# Patient Record
Sex: MALE | Race: WHITE | NOT HISPANIC OR LATINO | Employment: FULL TIME | URBAN - METROPOLITAN AREA
[De-identification: names, ages, dates, MRNs, and addresses within clinical notes are randomized per-mention and may not be internally consistent; named-entity substitution may affect disease eponyms.]

---

## 2019-04-15 ENCOUNTER — OFFICE VISIT (OUTPATIENT)
Dept: URGENT CARE | Facility: URGENT CARE | Age: 34
End: 2019-04-15
Payer: COMMERCIAL

## 2019-04-15 VITALS
DIASTOLIC BLOOD PRESSURE: 80 MMHG | BODY MASS INDEX: 45.94 KG/M2 | HEART RATE: 120 BPM | SYSTOLIC BLOOD PRESSURE: 160 MMHG | TEMPERATURE: 101.5 F | RESPIRATION RATE: 26 BRPM | WEIGHT: 315 LBS | OXYGEN SATURATION: 95 %

## 2019-04-15 DIAGNOSIS — J11.1 INFLUENZA: Primary | ICD-10-CM

## 2019-04-15 PROCEDURE — 99203 OFFICE O/P NEW LOW 30 MIN: CPT | Performed by: FAMILY MEDICINE

## 2019-04-15 RX ORDER — OSELTAMIVIR PHOSPHATE 75 MG/1
75 CAPSULE ORAL 2 TIMES DAILY
Qty: 10 CAPSULE | Refills: 0 | Status: SHIPPED | OUTPATIENT
Start: 2019-04-15 | End: 2019-04-20

## 2019-04-15 NOTE — LETTER
[facility]  [address]  [facility] [address]  Phone: [phone]    April 15, 2019        Afshin Sage  [address]   [place] [address]          To whom it may concern:    RE: Afshin Sage    Patient was seen and treated today at our clinic and treated for influenza. He should stay home tomorrow and rest and consider a return to work 4/17/19 if feeling well. Follow-up if symptoms persist or worsen.  I would expect this to gradually improve over the next 5 days.           Sincerely,        Keith Hebert MD

## 2019-04-15 NOTE — PROGRESS NOTES
SUBJECTIVE:   Afshin Sage is a 33 year old male presenting with a chief complaint of   Chief Complaint   Patient presents with     Breathing Problem     Trouble breathing started yesterday      Cough     Non productive until this am. Today coughing up brownish/red phlegm- sharp pain in center of chest when coughing, sneezing or breathing      Dizziness     Slight lightheadedness since yesterday afternoon      Fever     This morning       He is an established patient of [facility].    Fever, cough, shortness of breath, body aches and light headed.     Does not exercise much  Smoking on avg 1/2 pack per day. Was a pack a day until 2 years ago.       Review of Systems   Constitutional: Positive for fever. Negative for chills and diaphoresis.   HENT: Negative for congestion, ear pain, rhinorrhea and sore throat.    Respiratory: Positive for cough. Negative for shortness of breath.    Gastrointestinal: Negative for diarrhea, nausea and vomiting.   Neurological: Positive for dizziness.       History reviewed. No pertinent past medical history.  History reviewed. No pertinent family history.  Current Outpatient Medications   Medication Sig Dispense Refill     EFFEXOR 37.5 MG OR TABS 1 TABLET TWICE DAILY WITH FOOD Appt needed if no refills left (REPLACES ZOLOFT) (Patient not taking: No sig reported) 60 Tab 0     HYDROCHLOROTHIAZIDE 25 MG OR TABS 1 TABLET DAILY       HYDROXYZINE HCL 50 MG OR TABS 1 TABLET 4 TIMES DAILY AS NEEDED       NORVASC 5 MG OR TABS ONE DAILY Appt needed if no refills left (REPLACES hctz and propranolol) (Patient not taking: No sig reported) 31 Tab 0     PROPRANOLOL HCL 60 MG OR CPCR 1 CAPSULE DAILY       STRATTERA 25 MG OR CAPS 1 CAPSULE  DAILY Appt needed if no refills left (replaces 40 mg strattera) (Patient not taking: No sig reported) 31 Cap 0     STRATTERA 40 MG OR CAPS 1 CAPSULE EVERY MORNING       TRAZODONE  MG OR TABS 1/2-1 TABLET  AT BEDTIME AS NEEDED       ZOLOFT 100 MG OR TABS 2  TABLETS  DAILY       Social History     Tobacco Use     Smoking status: Current Every Day Smoker     Packs/day: 1.00     Years: 12.00     Pack years: 12.00     Types: Cigarettes     Smokeless tobacco: Current User   Substance Use Topics     Alcohol use: Not on file       OBJECTIVE  /80   Pulse 120   Temp 101.5  F (38.6  C) (Oral)   Resp 26   Wt (!) 157.9 kg (348 lb 3.2 oz)   SpO2 95%   BMI 45.94 kg/m      Physical Exam   Constitutional: He is oriented to person, place, and time.   Tired appearing   HENT:   Head: Normocephalic and atraumatic.   Right Ear: External ear normal.   Left Ear: External ear normal.   Nose: Nose normal.   Mouth/Throat: Oropharynx is clear and moist. No oropharyngeal exudate.   Eyes: Pupils are equal, round, and reactive to light. Conjunctivae are normal. Right eye exhibits no discharge. Left eye exhibits no discharge. No scleral icterus.   Neck: Neck supple. No tracheal deviation present. No thyromegaly present.   Cardiovascular: Normal rate, regular rhythm, normal heart sounds and intact distal pulses. Exam reveals no gallop and no friction rub.   No murmur heard.  Pulmonary/Chest: Effort normal and breath sounds normal. No stridor. No respiratory distress. He has no wheezes. He has no rales. He exhibits no tenderness.   Abdominal: Soft. Bowel sounds are normal. He exhibits no distension and no mass. There is no tenderness. There is no rebound and no guarding.   Musculoskeletal: He exhibits no edema, tenderness or deformity.   Lymphadenopathy:     He has no cervical adenopathy.   Neurological: He is alert and oriented to person, place, and time. No cranial nerve deficit.   Skin: Skin is warm and dry. No rash noted. No erythema.   Psychiatric: Judgment normal.            ASSESSMENT:    ICD-10-CM    1. Influenza J11.1 oseltamivir (TAMIFLU) 75 MG capsule        PLAN  Annual flu vaccine recommended.   Fluids, rest and hydration emphasized. ADAT  Complete smoking cessation encouraged    The patient indicates understanding of these issues and agrees with the plan.   Patient educational/instructional material provided including reasons for follow-up   Keith Hebert MD

## 2019-04-17 ASSESSMENT — ENCOUNTER SYMPTOMS
CHILLS: 0
DIZZINESS: 1
COUGH: 1
RHINORRHEA: 0
SORE THROAT: 0
DIAPHORESIS: 0
SHORTNESS OF BREATH: 0
DIARRHEA: 0
NAUSEA: 0
FEVER: 1
VOMITING: 0

## 2019-05-02 ENCOUNTER — OFFICE VISIT (OUTPATIENT)
Dept: FAMILY MEDICINE | Facility: CLINIC | Age: 34
End: 2019-05-02
Payer: COMMERCIAL

## 2019-05-02 VITALS
DIASTOLIC BLOOD PRESSURE: 64 MMHG | RESPIRATION RATE: 20 BRPM | SYSTOLIC BLOOD PRESSURE: 134 MMHG | WEIGHT: 315 LBS | TEMPERATURE: 97.9 F | HEART RATE: 89 BPM | OXYGEN SATURATION: 97 % | BODY MASS INDEX: 41.75 KG/M2 | HEIGHT: 73 IN

## 2019-05-02 DIAGNOSIS — S91.332A PUNCTURE WOUND OF LEFT FOOT, INITIAL ENCOUNTER: ICD-10-CM

## 2019-05-02 DIAGNOSIS — J20.9 ACUTE BRONCHITIS WITH SYMPTOMS > 10 DAYS: Primary | ICD-10-CM

## 2019-05-02 PROCEDURE — 99213 OFFICE O/P EST LOW 20 MIN: CPT | Performed by: PHYSICIAN ASSISTANT

## 2019-05-02 RX ORDER — CODEINE PHOSPHATE AND GUAIFENESIN 10; 100 MG/5ML; MG/5ML
1-2 SOLUTION ORAL EVERY 4 HOURS PRN
Qty: 118 ML | Refills: 0 | Status: SHIPPED | OUTPATIENT
Start: 2019-05-02 | End: 2022-03-04

## 2019-05-02 RX ORDER — AZITHROMYCIN 500 MG/1
500 TABLET, FILM COATED ORAL DAILY
Qty: 5 TABLET | Refills: 0 | Status: SHIPPED | OUTPATIENT
Start: 2019-05-02 | End: 2019-05-05

## 2019-05-02 ASSESSMENT — ANXIETY QUESTIONNAIRES
1. FEELING NERVOUS, ANXIOUS, OR ON EDGE: SEVERAL DAYS
7. FEELING AFRAID AS IF SOMETHING AWFUL MIGHT HAPPEN: NOT AT ALL
IF YOU CHECKED OFF ANY PROBLEMS ON THIS QUESTIONNAIRE, HOW DIFFICULT HAVE THESE PROBLEMS MADE IT FOR YOU TO DO YOUR WORK, TAKE CARE OF THINGS AT HOME, OR GET ALONG WITH OTHER PEOPLE: NOT DIFFICULT AT ALL
5. BEING SO RESTLESS THAT IT IS HARD TO SIT STILL: NOT AT ALL
3. WORRYING TOO MUCH ABOUT DIFFERENT THINGS: NOT AT ALL
GAD7 TOTAL SCORE: 1
6. BECOMING EASILY ANNOYED OR IRRITABLE: NOT AT ALL
2. NOT BEING ABLE TO STOP OR CONTROL WORRYING: NOT AT ALL

## 2019-05-02 ASSESSMENT — MIFFLIN-ST. JEOR: SCORE: 2542.33

## 2019-05-02 ASSESSMENT — PATIENT HEALTH QUESTIONNAIRE - PHQ9
SUM OF ALL RESPONSES TO PHQ QUESTIONS 1-9: 1
5. POOR APPETITE OR OVEREATING: NOT AT ALL

## 2019-05-02 NOTE — PROGRESS NOTES
"  SUBJECTIVE:   Afshin Sage is a 34 year old male who presents to clinic today for the following   health issues:      Patient presents with:  RECHECK: gary. was seen at [facility] about 2 weeks ago. 5 day ago flu like symptoms started to show up again. would like to r/o pneumonia  Foot Injury: left foot    Additional history: Patient stepped on a belt buckle.  Site painful but no erythema or suppuration.     Reviewed  and updated as needed this visit by clinical staff  Tobacco  Allergies  Meds  Med Hx  Surg Hx  Fam Hx  Soc Hx          ROS:  Constitutional, HEENT, cardiovascular, pulmonary, gi and gu systems are negative, except as otherwise noted.    OBJECTIVE:     /64   Pulse 89   Temp 97.9  F (36.6  C) (Oral)   Resp 20   Ht 1.862 m (6' 1.31\")   Wt (!) 154.9 kg (341 lb 6.4 oz)   SpO2 97%   BMI 44.67 kg/m    Body mass index is 44.67 kg/m .  GENERAL: healthy, alert and no distress  HENT: normal cephalic/atraumatic, ear canals and TM's normal, nasal mucosa edematous , oropharynx clear and oral mucous membranes moist  NECK: no adenopathy, no asymmetry, masses, or scars and thyroid normal to palpation  RESP: lungs clear to auscultation - no rales, rhonchi or wheezes but harsh  MS: no gross musculoskeletal defects noted, no edema  SKIN: 3 mm puncture wound plantar surface L foot.  No erythema or suppuration noted.     Diagnostic Test Results:  none     ASSESSMENT/PLAN:   1. Acute bronchitis with symptoms > 10 days  - azithromycin (ZITHROMAX) 500 MG tablet; Take 1 tablet (500 mg) by mouth daily for 3 days  Dispense: 5 tablet; Refill: 0  - guaiFENesin-codeine (ROBITUSSIN AC) 100-10 MG/5ML solution; Take 5-10 mLs by mouth every 4 hours as needed for cough  Dispense: 118 mL; Refill: 0    2. Puncture wound of left foot, initial encounter  Scrub with soap and water, dab with hydrogen peroxide and apply Vaseline daily.  May use hydrogen peroxide as a brief soak to remove any crusted " material before scrubbing if needed.     Use medication as directed.  Follow up if symptoms should persist, change or worsen.  Patient amenable to this follow up plan.     James Daugherty PA-C  [facility]

## 2019-05-03 ASSESSMENT — ANXIETY QUESTIONNAIRES: GAD7 TOTAL SCORE: 1

## 2019-11-07 ENCOUNTER — OFFICE VISIT (OUTPATIENT)
Dept: FAMILY MEDICINE | Facility: CLINIC | Age: 34
End: 2019-11-07
Payer: COMMERCIAL

## 2019-11-07 VITALS
DIASTOLIC BLOOD PRESSURE: 82 MMHG | BODY MASS INDEX: 44.46 KG/M2 | WEIGHT: 315 LBS | SYSTOLIC BLOOD PRESSURE: 136 MMHG | RESPIRATION RATE: 18 BRPM | HEART RATE: 89 BPM | OXYGEN SATURATION: 96 % | TEMPERATURE: 97.3 F

## 2019-11-07 DIAGNOSIS — R68.83 CHILLS: Primary | ICD-10-CM

## 2019-11-07 DIAGNOSIS — J06.9 VIRAL URI WITH COUGH: ICD-10-CM

## 2019-11-07 LAB
FLUAV+FLUBV AG SPEC QL: NEGATIVE
FLUAV+FLUBV AG SPEC QL: NEGATIVE
SPECIMEN SOURCE: NORMAL

## 2019-11-07 PROCEDURE — 99213 OFFICE O/P EST LOW 20 MIN: CPT | Performed by: PHYSICIAN ASSISTANT

## 2019-11-07 PROCEDURE — 87804 INFLUENZA ASSAY W/OPTIC: CPT | Performed by: PHYSICIAN ASSISTANT

## 2019-11-07 RX ORDER — CODEINE PHOSPHATE AND GUAIFENESIN 10; 100 MG/5ML; MG/5ML
1-2 SOLUTION ORAL EVERY 4 HOURS PRN
Qty: 118 ML | Refills: 0 | Status: SHIPPED | OUTPATIENT
Start: 2019-11-07 | End: 2022-03-04

## 2019-11-07 NOTE — PROGRESS NOTES
SUBJECTIVE:  Afshin Sage is a 34 year old male who presents with the following concerns;              Symptoms: cc Present Absent Comment   Fever/Chills  x  Chills, felt warm   Fatigue  x     Muscle Aches  x     Eye Irritation   x    Sneezing  x     Nasal Sarath/Drg  x     Sinus Pressure/Pain  x     Loss of smell  x     Dental pain   x    Sore Throat   x    Swollen Glands    Not sure   Ear Pain/Fullness   x    Cough  x     Wheeze  x     Chest Pain  x     Shortness of breath  x     Rash   x    Other   x      Symptom duration:  x3days   Sympom severity:  worsening   Treatments tried:  OTC tylenol, Advil, nyQuil    Contacts:  work-2 co-worker tested positive for influenza a       Medications updated and reviewed.  Past, family and surgical history is updated and reviewed in the record.    ROS:  Other than noted above, general, HEENT, respiratory, cardiac and gastrointestinal systems are negative.    OBJECTIVE:  BP (!) 136/98   Pulse 89   Temp 97.3  F (36.3  C) (Tympanic)   Resp 18   Wt (!) 154.1 kg (339 lb 12.8 oz)   SpO2 96%   BMI 44.46 kg/m    GENERAL: Pleasant and interactive.  Alert and oriented x 3.  No acute distress.   HEENT: Diffuse pharyngeal erythema.  Sclera, lids and conjunctiva are normal.  Nose and ears clear.  NECK: No adenopathy.  CHEST:  clear, no wheezing or rales. Normal symmetric air entry throughout both lung fields. No chest wall deformities or tenderness.  HEART:  S1 and S2 normal, no murmurs, clicks, gallops or rubs. Regular rate and rhythm.  SKIN:  Only benign skin findings. No unusual rashes or suspicious skin lesions noted. Nails appear normal.    Influenza: neg      Assessment:    Encounter Diagnoses   Name Primary?     Chills Yes     Viral URI with cough      Plan:   Orders Placed This Encounter     guaiFENesin-codeine (ROBITUSSIN AC) 100-10 MG/5ML solution         Supportive therapy also discussed. Follow up if symptoms fail to improve or worsen.      The patient was in  agreement with the plan today and had no questions or concerns prior to leaving the clinic.     Ashleigh Mcintyre PA-C

## 2019-11-07 NOTE — LETTER
[facility] [place]  [address]  [place] [address]  Phone: [phone]    November 7, 2019        Afshin Sage  [address]   [place] [address]          To whom it may concern:    RE: Afshin Sage    Patient was seen and treated today at our clinic and will miss work tonight and tomorrow (11/8/19).    Please contact me for questions or concerns.      Sincerely,        Ashleigh Mcintyre PA-C

## 2019-11-07 NOTE — PATIENT INSTRUCTIONS
For sinus congestion: Sudafed and DayQuil  For cough: Delsym and NyQuil (before bed)  For pain: ibuprofen 600mg every 6-8 hours as needed    Your strep test is negative. Your symptoms are likely caused by a viral syndrome. Viral syndromes typically last 1-2 weeks.  Increase your water intake in order to keep the secretions/mucous in your upper respiratory tract thin. Get plenty of rest and wash your hands well. Follow up if symptoms fail to improve or worsen.      Call the clinic if your symptoms have not shown improvement by the 2 week mane.

## 2019-12-23 ENCOUNTER — ANCILLARY PROCEDURE (OUTPATIENT)
Dept: GENERAL RADIOLOGY | Facility: CLINIC | Age: 34
End: 2019-12-23
Attending: FAMILY MEDICINE
Payer: COMMERCIAL

## 2019-12-23 ENCOUNTER — OFFICE VISIT (OUTPATIENT)
Dept: FAMILY MEDICINE | Facility: CLINIC | Age: 34
End: 2019-12-23
Payer: COMMERCIAL

## 2019-12-23 VITALS
DIASTOLIC BLOOD PRESSURE: 94 MMHG | SYSTOLIC BLOOD PRESSURE: 160 MMHG | BODY MASS INDEX: 44.74 KG/M2 | TEMPERATURE: 98.9 F | OXYGEN SATURATION: 97 % | HEART RATE: 90 BPM | WEIGHT: 315 LBS

## 2019-12-23 DIAGNOSIS — M54.50 ACUTE MIDLINE LOW BACK PAIN WITHOUT SCIATICA: ICD-10-CM

## 2019-12-23 DIAGNOSIS — M54.50 ACUTE MIDLINE LOW BACK PAIN WITHOUT SCIATICA: Primary | ICD-10-CM

## 2019-12-23 DIAGNOSIS — E66.01 MORBID OBESITY (H): ICD-10-CM

## 2019-12-23 LAB
ALBUMIN UR-MCNC: ABNORMAL MG/DL
AMORPH CRY #/AREA URNS HPF: ABNORMAL /HPF
APPEARANCE UR: ABNORMAL
BILIRUB UR QL STRIP: NEGATIVE
COLOR UR AUTO: YELLOW
GLUCOSE UR STRIP-MCNC: NEGATIVE MG/DL
HGB UR QL STRIP: NEGATIVE
KETONES UR STRIP-MCNC: NEGATIVE MG/DL
LEUKOCYTE ESTERASE UR QL STRIP: NEGATIVE
NITRATE UR QL: NEGATIVE
PH UR STRIP: 8 PH (ref 5–7)
RBC #/AREA URNS AUTO: ABNORMAL /HPF
SOURCE: ABNORMAL
SP GR UR STRIP: 1.01 (ref 1–1.03)
UROBILINOGEN UR STRIP-ACNC: 0.2 EU/DL (ref 0.2–1)
WBC #/AREA URNS AUTO: ABNORMAL /HPF

## 2019-12-23 PROCEDURE — 81001 URINALYSIS AUTO W/SCOPE: CPT | Performed by: FAMILY MEDICINE

## 2019-12-23 PROCEDURE — 99214 OFFICE O/P EST MOD 30 MIN: CPT | Performed by: FAMILY MEDICINE

## 2019-12-23 PROCEDURE — 72100 X-RAY EXAM L-S SPINE 2/3 VWS: CPT

## 2019-12-23 RX ORDER — MELOXICAM 15 MG/1
15 TABLET ORAL DAILY
Qty: 15 TABLET | Refills: 0 | Status: SHIPPED | OUTPATIENT
Start: 2019-12-23 | End: 2022-03-04

## 2019-12-23 RX ORDER — CYCLOBENZAPRINE HCL 10 MG
10 TABLET ORAL 3 TIMES DAILY PRN
Qty: 15 TABLET | Refills: 0 | Status: SHIPPED | OUTPATIENT
Start: 2019-12-23 | End: 2022-03-04

## 2019-12-23 NOTE — PROGRESS NOTES
Subjective     Afshin Sage is a 34 year old male who presents to clinic today for the following health issues:    HPI   Back Pain       Duration: x1 week ago        Specific cause: unsure    Description:   Location of pain: middle of back both  Character of pain: stabbing and intermittent  Pain radiation:none  New numbness or weakness in legs, not attributed to pain:  no     Intensity: Currently 4/10    History:   Pain interferes with job: YES  History of back problems: no prior back problems  Any previous MRI or X-rays: None  Sees a specialist for back pain:  No  Therapies tried without relief: IBU, icy hot patches and acetaminophen (Tylenol)    Alleviating factors:   Improved by: none      Precipitating factors:  Worsened by: any movements          Accompanying Signs & Symptoms:  Risk of Fracture:  None  Risk of Cauda Equina:  None  Risk of Infection:  None  Risk of Cancer:  None  Risk of Ankylosing Spondylitis:  Onset at age <35, male, AND morning back stiffness. no                     Patient Active Problem List   Diagnosis     Hypertension     ADHD (attention deficit hyperactivity disorder)     Alcohol dependence in remission (H)     Mixed anxiety and depressive disorder     Morbid obesity (H)     History reviewed. No pertinent surgical history.    Social History     Tobacco Use     Smoking status: Current Every Day Smoker     Packs/day: 1.00     Years: 12.00     Pack years: 12.00     Types: Cigarettes     Smokeless tobacco: Never Used   Substance Use Topics     Alcohol use: Not Currently     History reviewed. No pertinent family history.      Current Outpatient Medications   Medication Sig Dispense Refill     cyclobenzaprine (FLEXERIL) 10 MG tablet Take 1 tablet (10 mg) by mouth 3 times daily as needed for muscle spasms 15 tablet 0     meloxicam (MOBIC) 15 MG tablet Take 1 tablet (15 mg) by mouth daily 15 tablet 0     EFFEXOR 37.5 MG OR TABS 1 TABLET TWICE DAILY WITH FOOD Appt needed if no refills left  (REPLACES ZOLOFT) (Patient not taking: No sig reported) 60 Tab 0     guaiFENesin-codeine (ROBITUSSIN AC) 100-10 MG/5ML solution Take 5-10 mLs by mouth every 4 hours as needed for cough (Patient not taking: Reported on 12/23/2019) 118 mL 0     guaiFENesin-codeine (ROBITUSSIN AC) 100-10 MG/5ML solution Take 5-10 mLs by mouth every 4 hours as needed for cough (Patient not taking: Reported on 12/23/2019) 118 mL 0     HYDROCHLOROTHIAZIDE 25 MG OR TABS 1 TABLET DAILY       HYDROXYZINE HCL 50 MG OR TABS 1 TABLET 4 TIMES DAILY AS NEEDED       NORVASC 5 MG OR TABS ONE DAILY Appt needed if no refills left (REPLACES hctz and propranolol) (Patient not taking: No sig reported) 31 Tab 0     PROPRANOLOL HCL 60 MG OR CPCR 1 CAPSULE DAILY       STRATTERA 25 MG OR CAPS 1 CAPSULE  DAILY Appt needed if no refills left (replaces 40 mg strattera) (Patient not taking: No sig reported) 31 Cap 0     STRATTERA 40 MG OR CAPS 1 CAPSULE EVERY MORNING       TRAZODONE  MG OR TABS 1/2-1 TABLET  AT BEDTIME AS NEEDED       ZOLOFT 100 MG OR TABS 2 TABLETS  DAILY       No Known Allergies  No lab results found.   BP Readings from Last 3 Encounters:   12/23/19 (!) 160/94   11/07/19 136/82   05/02/19 134/64    Wt Readings from Last 3 Encounters:   12/23/19 (!) 155.1 kg (342 lb)   11/07/19 (!) 154.1 kg (339 lb 12.8 oz)   05/02/19 (!) 154.9 kg (341 lb 6.4 oz)                  Wt Readings from Last 4 Encounters:   12/23/19 (!) 155.1 kg (342 lb)   11/07/19 (!) 154.1 kg (339 lb 12.8 oz)   05/02/19 (!) 154.9 kg (341 lb 6.4 oz)   04/15/19 (!) 157.9 kg (348 lb 3.2 oz)         Reviewed and updated as needed this visit by Provider  Tobacco  Allergies  Meds  Problems  Med Hx  Surg Hx  Fam Hx         Review of Systems   ROS COMP: Constitutional, HEENT, cardiovascular, pulmonary, gi and gu systems are negative, except as otherwise noted.      Objective    BP (!) 160/94   Pulse 90   Temp 98.9  F (37.2  C) (Oral)   Wt (!) 155.1 kg (342 lb)   SpO2 97%   " BMI 44.74 kg/m    Body mass index is 44.74 kg/m .  Physical Exam   GENERAL: healthy, alert and no distress  EYES: Eyes grossly normal to inspection, PERRL and conjunctivae and sclerae normal  HENT: ear canals and TM's normal, nose and mouth without ulcers or lesions  NECK: no adenopathy, no asymmetry, masses, or scars and thyroid normal to palpation  RESP: lungs clear to auscultation - no rales, rhonchi or wheezes  CV: regular rate and rhythm, normal S1 S2, no S3 or S4, no murmur, click or rub, no peripheral edema and peripheral pulses strong  ABDOMEN: soft, nontender, no hepatosplenomegaly, no masses and bowel sounds normal  MS: no gross musculoskeletal defects noted, no edema  SKIN: no suspicious lesions or rashes  NEURO: Normal strength and tone, mentation intact and speech normal  PSYCH: mentation appears normal, affect normal/bright            Assessment & Plan     1. Acute midline low back pain without sciatica  Patient present with lower back pain for 1 week  No red flags  Will obtain UA to r/o kidney stones   X ray done showed no acute process   Prescribed Mobic and Flexeril.   - UA with Microscopic  - XR Lumbar Spine 2/3 Views; Future  - meloxicam (MOBIC) 15 MG tablet; Take 1 tablet (15 mg) by mouth daily  Dispense: 15 tablet; Refill: 0  - cyclobenzaprine (FLEXERIL) 10 MG tablet; Take 1 tablet (10 mg) by mouth 3 times daily as needed for muscle spasms  Dispense: 15 tablet; Refill: 0    2. Morbid obesity (H)  Discussed diet and exercise     BMI:   Estimated body mass index is 44.74 kg/m  as calculated from the following:    Height as of 5/2/19: 1.862 m (6' 1.31\").    Weight as of this encounter: 155.1 kg (342 lb).   Weight management plan: Discussed healthy diet and exercise guidelines    Patient verbalized understanding and agreed on the plan of care.  All questions answered.    Work on weight loss  Regular exercise    Return in about 4 weeks (around 1/20/2020).    Adrian Eddy MD  [facility] " [place]

## 2020-03-09 ENCOUNTER — TELEPHONE (OUTPATIENT)
Dept: FAMILY MEDICINE | Facility: CLINIC | Age: 35
End: 2020-03-09

## 2020-03-09 NOTE — LETTER
Afshin Sage  [address]   [place] [address]          March 9, 2020          Giacomo Sage      Our records indicate that you have not scheduled for a(n)Blood pressure check  which was recommended by your health care team. Monitoring and managing your preventative and chronic health conditions are very important to us.       If you have received your health care elsewhere, please provide us with that information so it can be documented in your chart.    Please call [phone] or message us through your [employer] account to schedule an appointment or provide information for your chart.     We look forward to seeing you and working with you on your health care needs.     Sincerely,   Dr. Eddy          *If you have already scheduled an appointment, please disregard this reminder

## 2020-03-09 NOTE — TELEPHONE ENCOUNTER
Panel Management Review      Patient has the following on his problem list:     Hypertension   Last three blood pressure readings:  BP Readings from Last 3 Encounters:   12/23/19 (!) 160/94   11/07/19 136/82   05/02/19 134/64     Blood pressure: FAILED    HTN Guidelines:  Less than 140/90      Composite cancer screening  Chart review shows that this patient is due/due soon for the following None  Summary:    Patient is due/failing the following:   BP CHECK    Action needed:   Patient needs office visit for see above.    Type of outreach:    Sent letter.    Questions for provider review:    None                                                                                                                                   Sera Bunch [facility]       Chart routed to closed .

## 2020-08-03 ENCOUNTER — TELEPHONE (OUTPATIENT)
Dept: FAMILY MEDICINE | Facility: CLINIC | Age: 35
End: 2020-08-03

## 2020-08-03 NOTE — LETTER
August 3, 2020      Afshin Sage  [address]   [facility] [address]        Hi, Afshin,     Our records indicate that you have not scheduled for a(n) blood pressure check with your primary care provider which is due now and recommended by your health care team. Managing your preventative and chronic health conditions is important to us. During the COVID-19 pandemic, options are available for you to safely get the care you need. If you have received your health care elsewhere, please provide us with that information so it can be documented in your chart.    Video Visit:  Please schedule a video visit. Request an appointment through [employer] or call us at [phone].    A video visit is a two-way, real-time, interactive video and audio appointment with your provider, using a secure dioni called [employer] Touchpoint on your mobile device or a browser on your computer. Video visits will be billed the same as in-person visits, including deductibles and co-insurance.     Telephone Visit:  You can schedule a telephone visit. Request an appointment through [employer] or call us at [phone].    A telephone visit is a two-way, real-time, audio scheduled appointment with your provider. Telephone visits are billed at different rates depending on your insurance coverage. During this emergency period, for some insurers, patients may be billed the same as an in-person visit. Please reach out to your insurance provider with any questions.       Thank you for choosing us as a partner in health care.     Your [facility] Care Team

## 2020-08-03 NOTE — TELEPHONE ENCOUNTER
Patient Quality Outreach      Summary:    Patient is due/failing the following:   Hypertension follow-up visit    Type of outreach:    Sent letter.    Questions for provider review:    None                                                                                   **Start Working phrase here:**       Patient has the following on his problem list/HM:     Hypertension   Last three blood pressure readings:  BP Readings from Last 3 Encounters:   12/23/19 (!) 160/94   11/07/19 136/82   05/02/19 134/64     Blood pressure: Failed    HTN Guidelines:  ? 139/89                                                 Sera Bunch [facility]     Chart routed to closed.

## 2020-08-10 ENCOUNTER — NURSE TRIAGE (OUTPATIENT)
Dept: NURSING | Facility: CLINIC | Age: 35
End: 2020-08-10

## 2020-08-10 ENCOUNTER — OFFICE VISIT (OUTPATIENT)
Dept: URGENT CARE | Facility: URGENT CARE | Age: 35
End: 2020-08-10

## 2020-08-10 VITALS
BODY MASS INDEX: 48.72 KG/M2 | DIASTOLIC BLOOD PRESSURE: 102 MMHG | HEART RATE: 97 BPM | OXYGEN SATURATION: 96 % | TEMPERATURE: 99.7 F | RESPIRATION RATE: 24 BRPM | SYSTOLIC BLOOD PRESSURE: 170 MMHG | WEIGHT: 315 LBS

## 2020-08-10 DIAGNOSIS — S06.0X0A CONCUSSION WITHOUT LOSS OF CONSCIOUSNESS, INITIAL ENCOUNTER: Primary | ICD-10-CM

## 2020-08-10 PROCEDURE — 99213 OFFICE O/P EST LOW 20 MIN: CPT | Performed by: FAMILY MEDICINE

## 2020-08-10 ASSESSMENT — PAIN SCALES - GENERAL: PAINLEVEL: SEVERE PAIN (6)

## 2020-08-10 NOTE — PROGRESS NOTES
SUBJECTIVE:   Afshin Sage is a 35 year old male presenting with a chief complaint of   Chief Complaint   Patient presents with     Head Injury     Hit left side of head on brick wall     Work Comp     08/10/2020       He is an established patient of [facility].    Afshin is a 35-year-old male presenting today after a concussion type injury while at work.  While in the close residential closet and attempting to stand he hit his head against a low brick wall that he had forgotten was there immediately he noticed a white flash of light in it since that time is had headache along the left parietal area 6/10 and improving some of his scalp is also feeling somewhat dizzy over the past half hour.  This instance happened approximately 3 hours ago.  He had called the nurse line wondering what he should come to the urgent care was feeling dizzy on the phone and therefore was advised to present to the urgent care.    Denies any previous concussions recent head injuries or falls denies any easy bruising bleeding or clotting disorders      Past history is significant for previous knee surgery ACL reconstruction of his right knee.  Was on HCTZ for blood pressure about 10 years ago was lightheaded at that time and so it was stopped he is not currently taking any blood pressure medicines.  His blood pressure does seem to be somewhat elevated.        Rare EtOH      Tylenol or ibuprofen for headaches averaging approximately 2 headaches per month seem to resolve very quickly on either Tylenol or ibuprofen.      Review of Systems    History reviewed. No pertinent past medical history.  History reviewed. No pertinent family history.  Current Outpatient Medications   Medication Sig Dispense Refill     cyclobenzaprine (FLEXERIL) 10 MG tablet Take 1 tablet (10 mg) by mouth 3 times daily as needed for muscle spasms (Patient not taking: Reported on 8/10/2020) 15 tablet 0     EFFEXOR 37.5 MG OR TABS 1 TABLET TWICE DAILY WITH FOOD Appt  needed if no refills left (REPLACES ZOLOFT) (Patient not taking: No sig reported) 60 Tab 0     guaiFENesin-codeine (ROBITUSSIN AC) 100-10 MG/5ML solution Take 5-10 mLs by mouth every 4 hours as needed for cough (Patient not taking: Reported on 12/23/2019) 118 mL 0     guaiFENesin-codeine (ROBITUSSIN AC) 100-10 MG/5ML solution Take 5-10 mLs by mouth every 4 hours as needed for cough (Patient not taking: Reported on 12/23/2019) 118 mL 0     HYDROCHLOROTHIAZIDE 25 MG OR TABS 1 TABLET DAILY       HYDROXYZINE HCL 50 MG OR TABS 1 TABLET 4 TIMES DAILY AS NEEDED       meloxicam (MOBIC) 15 MG tablet Take 1 tablet (15 mg) by mouth daily (Patient not taking: Reported on 8/10/2020) 15 tablet 0     NORVASC 5 MG OR TABS ONE DAILY Appt needed if no refills left (REPLACES hctz and propranolol) (Patient not taking: No sig reported) 31 Tab 0     PROPRANOLOL HCL 60 MG OR CPCR 1 CAPSULE DAILY       STRATTERA 25 MG OR CAPS 1 CAPSULE  DAILY Appt needed if no refills left (replaces 40 mg strattera) (Patient not taking: No sig reported) 31 Cap 0     STRATTERA 40 MG OR CAPS 1 CAPSULE EVERY MORNING       TRAZODONE  MG OR TABS 1/2-1 TABLET  AT BEDTIME AS NEEDED       ZOLOFT 100 MG OR TABS 2 TABLETS  DAILY       Social History     Tobacco Use     Smoking status: Current Every Day Smoker     Packs/day: 1.00     Years: 12.00     Pack years: 12.00     Types: Cigarettes     Smokeless tobacco: Never Used   Substance Use Topics     Alcohol use: Not Currently       OBJECTIVE  BP (!) 170/102   Pulse 97   Temp 99.7  F (37.6  C) (Tympanic)   Resp 24   Wt (!) 168.9 kg (372 lb 6.4 oz)   SpO2 96%   BMI 48.72 kg/m      Physical Exam  Vitals signs reviewed.   HENT:      Head: Normocephalic.   Neck:      Musculoskeletal: Normal range of motion.   Cardiovascular:      Rate and Rhythm: Normal rate.   Pulmonary:      Effort: Pulmonary effort is normal.   Musculoskeletal:         General: Tenderness (Left parietal area of the scalp only without any  abrasions or evidence of swelling) present.   Skin:     General: Skin is warm.      Capillary Refill: Capillary refill takes less than 2 seconds.   Neurological:      General: No focal deficit present.      Mental Status: He is alert.   Psychiatric:         Mood and Affect: Mood normal.           ASSESSMENT:    ICD-10-CM    1. Concussion without loss of consciousness, initial encounter  S06.0X0A           PLAN:  He will avoid all exertional activity of falls or second impact over the next 7 to 10 days  Work note was provided with the following restrictions he may return to work if feeling well tomorrow (see letter)  Expect this to resolve over 7 to 10 days he may use Tylenol or ibuprofen proper dosing was described.  The patient indicates understanding of these issues and agrees with the plan.   Patient educational/instructional material provided including reasons for follow-up   Keith Hebert MD

## 2020-08-10 NOTE — TELEPHONE ENCOUNTER
"Pt called in stated he had head injury.  The injury happened 1:30 PM today.  The Pt states concrete wall ht him hard.  Pt states has dent on his head at the left side.  No bleeding.  Pt has head pain 6/10 on the scale.  No neck pain,  No vomit.  Has nausea.  Had disoriented symptom.  The disposition is to be seen today.  Care advice given per protocol.  The mother states she will take the Pt to the [facility] tomorrow.  Patient agrees with care advice given.   Agreed to call back if he has additional symptoms or questions.    Isaiah Tatum [facility] Nurse Advisor 8/10/2020 3:46 PM          Additional Information    Negative: [1] ACUTE NEURO SYMPTOM AND [2] present now  (DEFINITION: difficult to awaken OR confused thinking and talking OR slurred speech OR weakness of arms OR unsteady walking)    Negative: Knocked out (unconscious) > 1 minute    Negative: Seizure (convulsion) occurred  (Exception: prior history of seizures and now alert and without Acute Neuro Symptoms)    Negative: Penetrating head injury (e.g., knife, gun shot wound, metal object)    Negative: [1] Major bleeding (e.g., actively dripping or spurting) AND [2] can't be stopped    Negative: [1] Dangerous mechanism of injury (e.g., MVA, diving, trampoline, contact sports, fall > 10 feet or 3 meters) AND [2] NECK pain AND [3] began < 1 hour after injury    Negative: Sounds like a life-threatening emergency to the triager    Negative: [1] Recently examined and diagnosed with a concussion by a healthcare provider AND [2] questions about concussion symptoms    Negative: Can't remember what happened (amnesia)    Negative: Vomiting once or more    Negative: [1] Loss of vision or double vision AND [2] present now    Negative: Watery or blood-tinged fluid dripping from the NOSE or EARS now  (Exception: tears from crying or nosebleed from nasal trauma)    Negative: One or two \"black eyes\" (bruising, purple color of eyelids)    Negative: Large swelling or bruise > 2 inches " (5 cm)    Negative: Skin is split open or gaping  (or length > 1/2 inch or 12 mm)    Negative: [1] Bleeding AND [2] won't stop after 10 minutes of direct pressure (using correct technique)    Negative: Sounds like a serious injury to the triager    Negative: [1] ACUTE NEURO SYMPTOM AND [2] now fine  (DEFINITION: difficult to awaken OR confused thinking and talking OR slurred speech OR weakness of arms OR unsteady walking)    Negative: [1] Knocked out (unconscious) < 1 minute AND [2] now fine    Negative: [1] SEVERE headache AND [2] not improved 2 hours after pain medicine/ice packs    Negative: Dangerous injury (e.g., MVA, diving, trampoline, contact sports, fall > 10 feet or 3 meters) or severe blow from hard object (e.g., golf club or baseball bat)    Negative: Taking Coumadin (warfarin) or other strong blood thinner, or known bleeding disorder (e.g., thrombocytopenia)    Negative: Suspicious history for the injury    Negative: [1] Age over 65 years AND [2] swelling or bruise    Negative: Patient is confused or is an unreliable provider of information (e.g., dementia, profound mental retardation, alcohol intoxication)    Negative: [1] Last tetanus shot > 5 years ago AND [2] DIRTY cut or scrape    Negative: [1] After 72 hours AND [2] headache persists    Scalp swelling, bruise or pain    Protocols used: HEAD INJURY-A-

## 2020-08-10 NOTE — LETTER
[address] [address]  Phone: [phone]    August 10, 2020        Afshin Sage  [address]   [facility] [address]          To whom it may concern:    RE: Afshin Sage    Patient was seen and treated today at our clinic.  Sustained a mild concussion at work today when his head struck a brick wall by accident.  He may consider return to work tomorrow if his headache is improving.  If his headache is worsening he should consider repeat evaluation before returning to work.    Regardless, over the next 7 to 10 days he should avoid any extreme exertional activities or second impact injuries to the head.  He may need to take additional breaks while at work couple 2 or 3 extra breaks throughout the day to rest and avoid any heavy lifting or exercise over the next 7 days as this will likely worsen his headache.  I would expect this concussion to improve rapidly over the course of the week possibly even sooner.  For any increasing symptoms increasing headache confusion or neurological change she should go directly to the ER or return to his primary care doctor for further evaluation.        Sincerely,        Keith Hebert MD

## 2020-10-26 ENCOUNTER — VIRTUAL VISIT (OUTPATIENT)
Dept: FAMILY MEDICINE | Facility: OTHER | Age: 35
End: 2020-10-26
Payer: COMMERCIAL

## 2020-10-26 PROCEDURE — 99421 OL DIG E/M SVC 5-10 MIN: CPT | Performed by: PHYSICIAN ASSISTANT

## 2020-10-26 NOTE — PROGRESS NOTES
"Date: 10/26/2020 16:27:00  Clinician: Danielle Terry  Clinician NPI: [id number]  Patient: Afshin Sage  Patient : 1985  Patient Address: [address] [place] [address] [place] [address]  Patient Phone: [phone]  Visit Protocol: URI  Patient Summary:  Afshin is a 35 year old ( : 1985 ) male who initiated a [facility] Visit for COVID-19 (Coronavirus) evaluation and screening. When asked the question \"Please sign me up to receive news, health information and promotions from [facility].\", Afshin responded \"No\".    Afshin states his symptoms started 1-2 days ago.   His symptoms consist of a headache, a cough, nausea, myalgia, malaise, and a sore throat. He is experiencing mild difficulty breathing with activities but can speak normally in full sentences. Afshin also feels feverish.   Symptom details     Cough: Afshin coughs a few times an hour and his cough is not more bothersome at night. Phlegm does not come into his throat when he coughs. He does not believe his cough is caused by post-nasal drip.     Sore throat: Afshin reports having mild throat pain (1-3 on a 10 point pain scale), does not have exudate on his tonsils, and can swallow liquids. He is not sure if the lymph nodes in his neck are enlarged. A rash has not appeared on the skin since the sore throat started.     Temperature: His current temperature is 99.7 degrees Fahrenheit.     Headache: He states the headache is moderate (4-6 on a 10 point pain scale).      Afshin denies having ear pain, wheezing, nasal congestion, anosmia, vomiting, rhinitis, facial pain or pressure, chills, teeth pain, ageusia, and diarrhea. He also denies having a sinus infection within the past year, taking antibiotic medication in the past month, and having recent facial or sinus surgery in the past 60 days.   Precipitating events  Afshin is not sure if he has been exposed to someone with strep throat. He has not recently been exposed to someone with influenza. Afshin has been in " close contact with the following high risk individuals: children under the age of 5.   Pertinent COVID-19 (Coronavirus) information  In the past 14 days, Afshin has not worked in a congregate living setting.   He does not work or volunteer as healthcare worker or a  and does not work or volunteer in a healthcare facility.   Afshin also has not lived in a congregate living setting in the past 14 days. He does not live with a healthcare worker.   Afshin has not had a close contact with a laboratory-confirmed COVID-19 patient within 14 days of symptom onset.   Since December 2019, Afshin and has had upper respiratory infection (URI) or influenza-like illness. Has not been diagnosed with lab-confirmed COVID-19 test      Date(s) of previous URI or influenza-like illness (free-text): Late January     Symptoms Afshin experienced during previous URI or influenza-like illness as reported by the patient (free-text): Fever, productive cough, congestion, diarrhea, sore throat        Pertinent medical history  Afshin needs a return to work/school note.   Weight: 350 lbs   Afshin smokes or uses smokeless tobacco.   Additional information as reported by the patient (free text): I work for [employer]. They have me out of work til November 6th, pending a covid test.   Weight: 350 lbs    MEDICATIONS: No current medications, ALLERGIES: NKDA  Clinician Response:  Dear Afshin,   Your symptoms show that you may have coronavirus (COVID-19). This illness can cause fever, cough and trouble breathing. Many people get a mild case and get better on their own. Some people can get very sick.  What should I do?  We would like to test you for this virus.   1. Please call [phone] to schedule your visit. Explain that you were referred by [facility] to have a COVID-19 test. Be ready to share your [facility] visit ID number.  Please note that if you are assessed for Covid-19 testing and receive an order for testing from  "[facility], that the scheduling of your Covid test at [facility] may be delayed by three or four days or more due to limited availability for testing. Additional options for testing can be found on the [place] Covid-19 Response website. https://[place].gov/covid19/    The following will serve as your written order for this COVID Test, ordered by me, for the indication of suspected COVID [Z20.828]: The test will be ordered in [facility], our electronic health record, after you are scheduled. It will show as ordered and authorized by Dg Salmeron MD.  Order: COVID-19 (Coronavirus) PCR for SYMPTOMATIC testing from [facility].   2. When it's time for your COVID test:  Stay at least 6 feet away from others. (If someone will drive you to your test, stay in the backseat, as far away from the  as you can.)   Cover your mouth and nose with a mask, tissue or washcloth.  Go straight to the testing site. Don't make any stops on the way there or back.      3.Starting now: Stay home and away from others (self-isolate) until:   You've had no fever---and no medicine that reduces fever---for one full day (24 hours). And...   Your other symptoms have gotten better. For example, your cough or breathing has improved. And...   At least 10 days have passed since your symptoms started.       During this time, don't leave the house except for testing or medical care.   Stay in your own room, even for meals. Use your own bathroom if you can.   Stay away from others in your home. No hugging, kissing or shaking hands. No visitors.  Don't go to work, school or anywhere else.    Clean \"high touch\" surfaces often (doorknobs, counters, handles, etc.). Use a household cleaning spray or wipes. You'll find a full list of  on the EPA website: www.epa.gov/pesticide-registration/list-n-disinfectants-use-against-sars-cov-2.   Cover your mouth and nose with a mask, tissue or washcloth to avoid spreading germs.  Wash your hands and face often. Use soap " and water.  Caregivers in these groups are at risk for severe illness due to COVID-19:  o People 65 years and older  o People who live in a nursing home or long-term care facility  o People with chronic disease (lung, heart, cancer, diabetes, kidney, liver, immunologic)  o People who have a weakened immune system, including those who:   Are in cancer treatment  Take medicine that weakens the immune system, such as corticosteroids  Had a bone marrow or organ transplant  Have an immune deficiency  Have poorly controlled HIV or AIDS  Are obese (body mass index of 40 or higher)  Smoke regularly   o Caregivers should wear gloves while washing dishes, handling laundry and cleaning bedrooms and bathrooms.  o Use caution when washing and drying laundry: Don't shake dirty laundry, and use the warmest water setting that you can.  o For more tips, go to www.cdc.gov/coronavirus/2019-ncov/downloads/10Things.pdf.    4.Sign up for [email or web address]. We know it's scary to hear that you might have COVID-19. We want to track your symptoms to make sure you're okay over the next 2 weeks. Please look for an email from [email or web address]---this is a free, online program that we'll use to keep in touch. To sign up, follow the link in the email. Learn more at [email or web address]  How can I take care of myself?   Get lots of rest. Drink extra fluids (unless a doctor has told you not to).   Take Tylenol (acetaminophen) for fever or pain. If you have liver or kidney problems, ask your family doctor if it's okay to take Tylenol.   Adults can take either:    650 mg (two 325 mg pills) every 4 to 6 hours, or...   1,000 mg (two 500 mg pills) every 8 hours as needed.    Note: Don't take more than 3,000 mg in one day. Acetaminophen is found in many medicines (both prescribed and over-the-counter medicines). Read all labels to be sure you don't take too much.   For children, check the Tylenol bottle for the right dose. The dose is based on the  child's age or weight.    If you have other health problems (like cancer, heart failure, an organ transplant or severe kidney disease): Call your specialty clinic if you don't feel better in the next 2 days.       Know when to call 911. Emergency warning signs include:    Trouble breathing or shortness of breath Pain or pressure in the chest that doesn't go away Feeling confused like you haven't felt before, or not being able to wake up Bluish-colored lips or face.  Where can I get more information?   [facility] -- About COVID-19: [email or web address]   CDC -- What to Do If You're Sick: www.cdc.gov/coronavirus/2019-ncov/about/steps-when-sick.html   CDC -- Ending Home Isolation: www.cdc.gov/coronavirus/2019-ncov/hcp/disposition-in-home-patients.html   [facility] -- Caring for Someone: www.cdc.gov/coronavirus/2019-ncov/if-you-are-sick/care-for-someone.html   [facility] -- Interim Guidance for Hospital Discharge to Home: www.[facility] access hospital.[place]/diseases/coronavirus/hcp/hospdischarge.pdf   [facility] clinical trials (COVID-19 research studies): clinicalaffairs.[facility]-clinical-trials    Below are the COVID-19 hotlines at the [place] Department of Health ([facility]). Interpreters are available.    For health questions: Call 433-296-3379 or 1-873.983.1044 (7 a.m. to 7 p.m.) For questions about schools and childcare: Call 094-375-8146 or 1-278.563.7340 (7 a.m. to 7 p.m.)    Diagnosis: Contact with and (suspected) exposure to other viral communicable diseases  Diagnosis ICD: Z20.828

## 2022-03-04 ENCOUNTER — OFFICE VISIT (OUTPATIENT)
Dept: FAMILY MEDICINE | Facility: CLINIC | Age: 37
End: 2022-03-04
Payer: OTHER MISCELLANEOUS

## 2022-03-04 VITALS
HEIGHT: 73 IN | HEART RATE: 101 BPM | BODY MASS INDEX: 41.75 KG/M2 | WEIGHT: 315 LBS | OXYGEN SATURATION: 96 % | TEMPERATURE: 96.9 F | DIASTOLIC BLOOD PRESSURE: 85 MMHG | SYSTOLIC BLOOD PRESSURE: 145 MMHG

## 2022-03-04 DIAGNOSIS — Z01.818 PREOP GENERAL PHYSICAL EXAM: Primary | ICD-10-CM

## 2022-03-04 DIAGNOSIS — S83.207D TEAR OF MENISCUS OF LEFT KNEE AS CURRENT INJURY, UNSPECIFIED MENISCUS, UNSPECIFIED TEAR TYPE, SUBSEQUENT ENCOUNTER: ICD-10-CM

## 2022-03-04 DIAGNOSIS — R03.0 ELEVATED BLOOD PRESSURE READING WITHOUT DIAGNOSIS OF HYPERTENSION: ICD-10-CM

## 2022-03-04 PROCEDURE — 99214 OFFICE O/P EST MOD 30 MIN: CPT | Performed by: FAMILY MEDICINE

## 2022-03-04 ASSESSMENT — PAIN SCALES - GENERAL: PAINLEVEL: MODERATE PAIN (4)

## 2022-03-04 NOTE — PATIENT INSTRUCTIONS
Preparing for Your Surgery  Getting started  A nurse will call you to review your health history and instructions. They will give you an arrival time based on your scheduled surgery time. Please be ready to share:    Your doctor's clinic name and phone number    Your medical, surgical and anesthesia history    A list of allergies and sensitivities    A list of medicines, including herbal treatments and over-the-counter drugs    Whether the patient has a legal guardian (ask how to send us the papers in advance)  Please tell us if you're pregnant--or if there's any chance you might be pregnant. Some surgeries may injure a fetus (unborn baby), so they require a pregnancy test. Surgeries that are safe for a fetus don't always need a test, and you can choose whether to have one.   If you have a child who's having surgery, please ask for a copy of Preparing for Your Child's Surgery.    Preparing for surgery    Within 30 days of surgery: Have a pre-op exam (sometimes called an H&P, or History and Physical). This can be done at a clinic or pre-operative center.  ? If you're having a , you may not need this exam. Talk to your care team.    At your pre-op exam, talk to your care team about all medicines you take. If you need to stop any medicines before surgery, ask when to start taking them again.  ? We do this for your safety. Many medicines can make you bleed too much during surgery. Some change how well surgery (anesthesia) drugs work.    Call your insurance company to let them know you're having surgery. (If you don't have insurance, call [phone].)    Call your clinic if there's any change in your health. This includes signs of a cold or flu (sore throat, runny nose, cough, rash, fever). It also includes a scrape or scratch near the surgery site.    If you have questions on the day of surgery, call your hospital or surgery center.  COVID testing  You may need to be tested for COVID-19 before having  surgery. If so, your surgical team will give you instructions for scheduling this test, separate from your preoperative history and physical.  Eating and drinking guidelines  For your safety: Unless your surgeon tells you otherwise, follow the guidelines below.    Eat and drink as usual until 8 hours before surgery. After that, no food or milk.    Drink clear liquids until 2 hours before surgery. These are liquids you can see through, like water, Gatorade and Propel Water. You may also have black coffee and tea (no cream or milk).    Nothing by mouth within 2 hours of surgery. This includes gum, candy and breath mints.    If you drink alcohol: Stop drinking it the night before surgery.    If your care team tells you to take medicine on the morning of surgery, it's okay to take it with a sip of water.  Preventing infection    Shower or bathe the night before and morning of your surgery. Follow the instructions your clinic gave you. (If no instructions, use regular soap.)    Don't shave or clip hair near your surgery site. We'll remove the hair if needed.    Don't smoke or vape the morning of surgery. You may chew nicotine gum up to 2 hours before surgery. A nicotine patch is okay.  ? Note: Some surgeries require you to completely quit smoking and nicotine. Check with your surgeon.    Your care team will make every effort to keep you safe from infection. We will:  ? Clean our hands often with soap and water (or an alcohol-based hand rub).  ? Clean the skin at your surgery site with a special soap that kills germs.  ? Give you a special gown to keep you warm. (Cold raises the risk of infection.)  ? Wear special hair covers, masks, gowns and gloves during surgery.  ? Give antibiotic medicine, if prescribed. Not all surgeries need antibiotics.  What to bring on the day of surgery    Photo ID and insurance card    Copy of your health care directive, if you have one    Glasses and hearing aides (bring cases)  ? You can't  wear contacts during surgery    Inhaler and eye drops, if you use them (tell us about these when you arrive)    CPAP machine or breathing device, if you use them    A few personal items, if spending the night    If you have . . .  ? A pacemaker, ICD (cardiac defibrillator) or other implant: Bring the ID card.  ? An implanted stimulator: Bring the remote control.  ? A legal guardian: Bring a copy of the certified (court-stamped) guardianship papers.  Please remove any jewelry, including body piercings. Leave jewelry and other valuables at home.  If you're going home the day of surgery    You must have a responsible adult drive you home. They should stay with you overnight as well.    If you don't have someone to stay with you, and you aren't safe to go home alone, we may keep you overnight. Insurance often won't pay for this.  After surgery  If it's hard to control your pain or you need more pain medicine, please call your surgeon's office.  Questions?   If you have any questions for your care team, list them here: _________________________________________________________________________________________________________________________________________________________________________ ____________________________________ ____________________________________ ____________________________________  For informational purposes only. Not to replace the advice of your health care provider. Copyright   2003, 2019 [facility]. All rights reserved. Clinically reviewed by Nerissa Ding MD. [employer] 775898 - REV 07/21.

## 2022-03-04 NOTE — PROGRESS NOTES
[facility]  [address] [facility] [address]  Phone: [phone]  Primary Provider: Roula Walsh  Pre-op Performing Provider: MYRNA COLLAZO      PREOPERATIVE EVALUATION:  Today's date: 3/4/2022    Afshin Sage is a 36 year old male who presents for a preoperative evaluation.    Surgical Information:  Surgery/Procedure: Meniscus Repair- Left Knee   Surgery Location: [facility]   Surgeon: Dr. Willian Gupta  Surgery Date: 3/16/22  Time of Surgery: TBD  Where patient plans to recover: At home with family  Fax number for surgical facility: [phone]    Type of Anesthesia Anticipated: to be determined    Assessment & Plan     The proposed surgical procedure is considered INTERMEDIATE risk.    Preop general physical exam  There is no contraindication for the surgery    Tear of meniscus of left knee as current injury, unspecified meniscus, unspecified tear type, subsequent encounter    Elevated blood pressure reading without diagnosis of hypertension  Blood pressure elevated today.  No previous diagnosis of hypertension.  Discussed diet and exercise.  Return to the clinic after the surgery for blood pressure follow-up       Risks and Recommendations:  The patient has the following additional risks and recommendations for perioperative complications:   - Morbid obesity (BMI >40)    Medication Instructions:  Patient is on no chronic medications    RECOMMENDATION:  APPROVAL GIVEN to proceed with proposed procedure, without further diagnostic evaluation.                      Subjective     HPI related to upcoming procedure:     Patient is here for preop evaluation for upcoming procedure.  He is a scheduled for left knee meniscus repair surgery.  Today he is doing well.  Denies any chest pain, shortness of breath, palpitations, dizziness.    Preop Questions 3/3/2022   1. Have you ever had a heart attack or stroke? No   2. Have you ever had surgery on your heart or blood  vessels, such as a stent placement, a coronary artery bypass, or surgery on an artery in your head, neck, heart, or legs? No   3. Do you have chest pain with activity? No   4. Do you have a history of  heart failure? No   5. Do you currently have a cold, bronchitis or symptoms of other infection? No   6. Do you have a cough, shortness of breath, or wheezing? No   7. Do you or anyone in your family have previous history of blood clots? No   8. Do you or does anyone in your family have a serious bleeding problem such as prolonged bleeding following surgeries or cuts? No   9. Have you ever had problems with anemia or been told to take iron pills? No   10. Have you had any abnormal blood loss such as black, tarry or bloody stools? No   11. Have you ever had a blood transfusion? No   12. Are you willing to have a blood transfusion if it is medically needed before, during, or after your surgery? Yes   13. Have you or any of your relatives ever had problems with anesthesia? No   14. Do you have sleep apnea, excessive snoring or daytime drowsiness? UNKNOWN - snores but previous diagnosis of CHONG    15. Do you have any artifical heart valves or other implanted medical devices like a pacemaker, defibrillator, or continuous glucose monitor? No   16. Do you have artificial joints? No   17. Are you allergic to latex? No       Health Care Directive:  Patient does not have a Health Care Directive or Living Will: Discussed advance care planning with patient; however, patient declined at this time.    Preoperative Review of :   reviewed - no record of controlled substances prescribed.      Status of Chronic Conditions:  See problem list for active medical problems.  Problems all longstanding and stable, except as noted/documented.  See ROS for pertinent symptoms related to these conditions.      Review of Systems  Constitutional, neuro, ENT, endocrine, pulmonary, cardiac, gastrointestinal, genitourinary, musculoskeletal,  "integument and psychiatric systems are negative, except as otherwise noted.    Patient Active Problem List    Diagnosis Date Noted     Morbid obesity (H) 12/23/2019     Priority: Medium     Hypertension 02/23/2010     Priority: Medium     ADHD (attention deficit hyperactivity disorder) 02/23/2010     Priority: Medium     Alcohol dependence in remission (H) 02/23/2010     Priority: Medium     FEB 2010  (Problem list name updated by automated process. Provider to review and confirm.)       Mixed anxiety and depressive disorder 02/23/2010     Priority: Medium      History reviewed. No pertinent past medical history.  History reviewed. No pertinent surgical history.  No current outpatient medications on file.       Allergies   Allergen Reactions     Buspirone Other (See Comments)     Suicidal thoughts  Suicidal thoughts       Escitalopram Other (See Comments)     Suicidal thoughts  Suicidal thoughts       Sertraline Other (See Comments)     Suicidal thoughts  Suicidal thoughts       Iohexol      Other reaction(s): *None-Radiology Only  Sneezing, congestion  Other reaction(s): *None-Radiology Only  Sneezing, congestion          Social History     Tobacco Use     Smoking status: Current Every Day Smoker     Packs/day: 1.00     Years: 12.00     Pack years: 12.00     Types: Cigarettes     Smokeless tobacco: Never Used   Substance Use Topics     Alcohol use: Not Currently     History reviewed. No pertinent family history.  History   Drug Use Unknown         Objective     BP (!) 145/85   Pulse 101   Temp 96.9  F (36.1  C) (Tympanic)   Ht 1.862 m (6' 1.3\")   Wt (!) 179.2 kg (395 lb)   SpO2 96%   BMI 51.69 kg/m      Physical Exam    GENERAL APPEARANCE: healthy, alert and no distress     EYES: EOMI,  PERRL     HENT: ear canals and TM's normal and nose and mouth without ulcers or lesions     NECK: no adenopathy, no asymmetry, masses, or scars and thyroid normal to palpation     RESP: lungs clear to auscultation - no rales, " rhonchi or wheezes     CV: regular rates and rhythm, normal S1 S2, no S3 or S4 and no murmur, click or rub     ABDOMEN:  soft, nontender, no HSM or masses and bowel sounds normal     MS: extremities normal- no gross deformities noted, no evidence of inflammation in joints, FROM in all extremities.     SKIN: no suspicious lesions or rashes     NEURO: Normal strength and tone, sensory exam grossly normal, mentation intact and speech normal     PSYCH: mentation appears normal. and affect normal/bright     LYMPHATICS: No cervical adenopathy    No results for input(s): HGB, PLT, INR, NA, POTASSIUM, CR, A1C in the last 44927 hours.     Diagnostics:  No labs were ordered during this visit.   No EKG required, no history of coronary heart disease, significant arrhythmia, peripheral arterial disease or other structural heart disease.    Revised Cardiac Risk Index (RCRI):  The patient has the following serious cardiovascular risks for perioperative complications:   - No serious cardiac risks = 0 points     RCRI Interpretation: 0 points: Class I (very low risk - 0.4% complication rate)           Signed Electronically by: Adrian Eddy MD  Copy of this evaluation report is provided to requesting physician.

## 2022-03-12 ENCOUNTER — HEALTH MAINTENANCE LETTER (OUTPATIENT)
Age: 37
End: 2022-03-12

## 2022-03-14 ENCOUNTER — LAB (OUTPATIENT)
Dept: LAB | Facility: CLINIC | Age: 37
End: 2022-03-14
Payer: COMMERCIAL

## 2022-03-14 DIAGNOSIS — Z20.822 ENCOUNTER FOR LABORATORY TESTING FOR COVID-19 VIRUS: ICD-10-CM

## 2022-03-14 PROCEDURE — U0005 INFEC AGEN DETEC AMPLI PROBE: HCPCS

## 2022-03-14 PROCEDURE — U0003 INFECTIOUS AGENT DETECTION BY NUCLEIC ACID (DNA OR RNA); SEVERE ACUTE RESPIRATORY SYNDROME CORONAVIRUS 2 (SARS-COV-2) (CORONAVIRUS DISEASE [COVID-19]), AMPLIFIED PROBE TECHNIQUE, MAKING USE OF HIGH THROUGHPUT TECHNOLOGIES AS DESCRIBED BY CMS-2020-01-R: HCPCS

## 2022-03-15 LAB — SARS-COV-2 RNA RESP QL NAA+PROBE: NEGATIVE

## 2023-01-07 ENCOUNTER — HEALTH MAINTENANCE LETTER (OUTPATIENT)
Age: 38
End: 2023-01-07

## 2023-02-16 ENCOUNTER — VIRTUAL VISIT (OUTPATIENT)
Dept: FAMILY MEDICINE | Facility: CLINIC | Age: 38
End: 2023-02-16
Payer: COMMERCIAL

## 2023-02-16 ENCOUNTER — LAB (OUTPATIENT)
Dept: FAMILY MEDICINE | Facility: CLINIC | Age: 38
End: 2023-02-16
Attending: PHYSICIAN ASSISTANT
Payer: COMMERCIAL

## 2023-02-16 ENCOUNTER — TELEPHONE (OUTPATIENT)
Dept: FAMILY MEDICINE | Facility: CLINIC | Age: 38
End: 2023-02-16

## 2023-02-16 DIAGNOSIS — R07.0 THROAT PAIN: ICD-10-CM

## 2023-02-16 DIAGNOSIS — R05.1 ACUTE COUGH: ICD-10-CM

## 2023-02-16 DIAGNOSIS — J02.0 STREPTOCOCCAL PHARYNGITIS: Primary | ICD-10-CM

## 2023-02-16 LAB
DEPRECATED S PYO AG THROAT QL EIA: POSITIVE
FLUAV AG SPEC QL IA: NEGATIVE
FLUBV AG SPEC QL IA: NEGATIVE

## 2023-02-16 PROCEDURE — U0005 INFEC AGEN DETEC AMPLI PROBE: HCPCS

## 2023-02-16 PROCEDURE — 87804 INFLUENZA ASSAY W/OPTIC: CPT

## 2023-02-16 PROCEDURE — 87880 STREP A ASSAY W/OPTIC: CPT

## 2023-02-16 PROCEDURE — 99207 PR NO CHARGE NURSE ONLY: CPT

## 2023-02-16 PROCEDURE — U0003 INFECTIOUS AGENT DETECTION BY NUCLEIC ACID (DNA OR RNA); SEVERE ACUTE RESPIRATORY SYNDROME CORONAVIRUS 2 (SARS-COV-2) (CORONAVIRUS DISEASE [COVID-19]), AMPLIFIED PROBE TECHNIQUE, MAKING USE OF HIGH THROUGHPUT TECHNOLOGIES AS DESCRIBED BY CMS-2020-01-R: HCPCS

## 2023-02-16 PROCEDURE — 99213 OFFICE O/P EST LOW 20 MIN: CPT | Mod: TEL | Performed by: PHYSICIAN ASSISTANT

## 2023-02-16 RX ORDER — PENICILLIN V POTASSIUM 500 MG/1
500 TABLET, FILM COATED ORAL 2 TIMES DAILY
Qty: 20 TABLET | Refills: 0 | Status: SHIPPED | OUTPATIENT
Start: 2023-02-16 | End: 2023-02-26

## 2023-02-16 RX ORDER — AZITHROMYCIN 250 MG/1
TABLET, FILM COATED ORAL
Qty: 6 TABLET | Refills: 0 | Status: SHIPPED | OUTPATIENT
Start: 2023-02-22 | End: 2023-02-27

## 2023-02-16 NOTE — PROGRESS NOTES
Vincenzo is a 37 year old who is being evaluated via a billable telephone visit.      What phone number would you like to be contacted at? [phone]  How would you like to obtain your AVS? Fernando  Distant Location (provider location):  On-site    Assessment & Plan   Problem List Items Addressed This Visit    None  Visit Diagnoses     Streptococcal pharyngitis    -  Primary    Relevant Medications    penicillin V (VEETID) 500 MG tablet    Throat pain        Relevant Medications    azithromycin (ZITHROMAX) 250 MG tablet (Start on 2/22/2023)    penicillin V (VEETID) 500 MG tablet    Other Relevant Orders    Symptomatic COVID-19 Virus (Coronavirus) by PCR    Acute cough        Relevant Medications    azithromycin (ZITHROMAX) 250 MG tablet (Start on 2/22/2023)    Other Relevant Orders    Symptomatic COVID-19 Virus (Coronavirus) by PCR         Impression is strep pharyngitis. + rapid test. Flu neg. Will order COVID-19 PCR. Sounds well and non-toxic and I have low suspicion for impending airway obstruction or respiratory distress at this point.  He will push p.o. fluids, use over-the-counter meds for symptoms, complete a course of Pen VK, and follow-up with us in 1-2 weeks if not improving or urgent care/the ER if symptoms worsen/change at any time.    DDx and Dx discussed with and explained to the pt to their satisfaction.  All questions were answered at this time. Pt expressed understanding of and agreement with this dx, tx, and plan. No further workup warranted and standard medication warnings given. I have given the patient a list of pertinent indications for re-evaluation. Will go to the Emergency Department if symptoms worsen or new concerning symptoms arise. Patient left the call in no apparent distress.     Ordering of each unique test  Prescription drug management  15 minutes spent on the date of the encounter doing chart review, history and exam, documentation and further activities per the note    "  Nicotine/Tobacco Cessation:  He reports that he has been smoking cigarettes. He has a 12.00 pack-year smoking history. He has never used smokeless tobacco.    BMI:   Estimated body mass index is 51.69 kg/m  as calculated from the following:    Height as of 3/4/22: 1.862 m (6' 1.3\").    Weight as of 3/4/22: 179.2 kg (395 lb).     See Patient Instructions    Return in about 2 weeks (around 3/2/2023) for a recheck of your symptoms if not improving, or call 911/go to an ER anytime if worsening.    COOPER Cedeño [facility] MELANIE Loya is a 37 year old presenting for the following health issues:  No chief complaint on file.      History of Present Illness       Reason for visit:  Feeling sick  Symptom onset:  1-3 days ago  Symptoms include:  Headache, painful coughing, sore throat, congestion,  Symptom intensity:  Moderate  Symptom progression:  Worsening  Had these symptoms before:  No  What makes it worse:  Coughing, talking  What makes it better:  Sleeping    He eats 0-1 servings of fruits and vegetables daily.He consumes 4 sweetened beverage(s) daily.He exercises with enough effort to increase his heart rate 30 to 60 minutes per day.  He exercises with enough effort to increase his heart rate 5 days per week.   He is taking medications regularly.     Ill for 4 days. Body aches, cough, sore throat. Chest discomfort with coughing began yesterday. Nasal congestion. Symptoms worse in morning. No n/v/d, sob or fevers. Negative COVID test yesterday.      Review of Systems   Constitutional, HEENT, cardiovascular, pulmonary, gi and gu systems are negative, except as otherwise noted.      Objective           Vitals:  No vitals were obtained today due to virtual visit.    Physical Exam   healthy, alert and no distress  PSYCH: Alert and oriented times 3; coherent speech, normal   rate and volume, able to articulate logical thoughts, able   to abstract reason, no tangential thoughts, no " hallucinations   or delusions  His affect is normal and pleasant  RESP: No cough, no audible wheezing, able to talk in full sentences  Remainder of exam unable to be completed due to telephone visits    Results for orders placed or performed in visit on 02/16/23   Influenza A & B Antigen - Clinic Collect     Status: Normal    Specimen: Nose; Swab   Result Value Ref Range    Influenza A antigen Negative Negative    Influenza B antigen Negative Negative    Narrative    Test results must be correlated with clinical data. If necessary, results should be confirmed by a molecular assay or viral culture.   Streptococcus A Rapid Screen w/Reflex to PCR - Clinic Collect     Status: Abnormal    Specimen: Throat; Swab   Result Value Ref Range    Group A Strep antigen Positive (A) Negative               Phone call duration: 12 minutes

## 2023-02-16 NOTE — PATIENT INSTRUCTIONS
Ximena Loya,    Thank you for allowing [facility] to manage your care.    I am unsure of the cause of your symptoms, but we will see what our workup shows.     If you develop worsening/changing symptoms at any time, please call 911 or go to the emergency department for evaluation.    I ordered some lab work, please call [address] [place] [address] or your local [facility] to schedule a lab only visit for swabs.    I sent your prescriptions to your pharmacy. Begin the azithromycin in one week if not improving.    Take a probiotic pill, eat live culture yogurt (Greek yogurt or Activia), drink kefir daily for one week if you have to take the antibiotics to encourage growth of good bacteria in your system.    Please allow 1-2 business days for our office to contact you in regards to your laboratory/radiological studies.  If not done so, I encourage you to login into [email or web address] to review your results as well.     If you have any questions or concerns, please feel free to call us at [phone]    Sincerely,    Lonnie Barrios PA-C    Did you know?      You can schedule a video visit for follow-up appointments as well as future appointments for certain conditions.  Please see the below link.     [email or web address]    If you have not already done so,  I encourage you to sign up for el?t ([email or web address]).  This will allow you to review your results, securely communicate with a provider, and schedule virtual visits as well.

## 2023-02-16 NOTE — TELEPHONE ENCOUNTER
Pt calling in for an appt.     Sx : sore throat, cough, headache    Denies fevers    Covid negative    States he tried to schedule an appt but  told him they do not want sick people coming to the clinic and he needed to talk to a Nurse first.       Pt placed on schedule with same day provider today .     Samantha Barton RN  [facility]

## 2023-02-17 LAB — SARS-COV-2 RNA RESP QL NAA+PROBE: NEGATIVE

## 2023-03-06 ENCOUNTER — OFFICE VISIT (OUTPATIENT)
Dept: URGENT CARE | Facility: URGENT CARE | Age: 38
End: 2023-03-06
Payer: COMMERCIAL

## 2023-03-06 VITALS
TEMPERATURE: 98.4 F | WEIGHT: 315 LBS | DIASTOLIC BLOOD PRESSURE: 110 MMHG | BODY MASS INDEX: 48.99 KG/M2 | HEART RATE: 91 BPM | SYSTOLIC BLOOD PRESSURE: 169 MMHG

## 2023-03-06 DIAGNOSIS — J02.0 STREP THROAT: ICD-10-CM

## 2023-03-06 DIAGNOSIS — L40.4 ACUTE GUTTATE PSORIASIS: Primary | ICD-10-CM

## 2023-03-06 PROCEDURE — 99214 OFFICE O/P EST MOD 30 MIN: CPT | Performed by: FAMILY MEDICINE

## 2023-03-06 RX ORDER — CEPHALEXIN 500 MG/1
500 CAPSULE ORAL 4 TIMES DAILY
Qty: 40 CAPSULE | Refills: 0 | Status: SHIPPED | OUTPATIENT
Start: 2023-03-06 | End: 2023-03-16

## 2023-03-06 RX ORDER — BETAMETHASONE DIPROPIONATE 0.5 MG/G
OINTMENT, AUGMENTED TOPICAL 2 TIMES DAILY
Qty: 50 G | Refills: 3 | Status: SHIPPED | OUTPATIENT
Start: 2023-03-06

## 2023-03-07 NOTE — PROGRESS NOTES
[facility] Dermatology Note  Encounter Date: Mar 8, 2023  Office Visit     Dermatology Problem List:  1. Acute guttae psoriasis   - Humira, betamethasone 0.05% ointment   - S/p keflex 500 mg  ____________________________________________    Assessment & Plan:    # Acute guttate psoriasis, severely flaring. (BSA 75%) Discussed treatment options, including topicals, nbUVB, and systemic therapies such as Humira, Taltz or methotrexate. Patient cannot realistically come to clinic 3x weekly for light therapy, elected to try Humira. Would prefer to avoid methotrexate due to history of elevated liver levels.  - Prescription provided for Humira 40 mg every other week.  Instructed to inject 80 mg (2 syringes) day 0, 40 mg day 7, then 40 mg every other week.    - Labs today: CBC, Quant Gold TB, ALT, AST, HIV, Hep B, Hep C, hepatic, CMP.   - Can continue betamethasone 0.05% ointment BID as needed   -Finish antibiotics.   - Advised to avoid fragranted lotions  - Photodocumentation today    Discussed expectations and triggers for psoriasis. May be at risk for CAD, HTN, DM and lymphoma and should be monitored at a primary care office.    # hyperglycemia,  Discussed follow up with pmd within 2 weeks.   -non fasting glucose, needs repeat labs.     Procedures Performed:   None.    Follow-up: 3 month(s) in-person, or earlier for new or changing lesions    Staff and Scribe:     Scribe Disclosure:  I, DAMON SUTTON, am serving as a scribe to document services personally performed by Eliza Gannon PA-C based on data collection and the provider's statements to me.     Provider Disclosure:   The documentation recorded by the scribe accurately reflects the services I personally performed and the decisions made by me.    All risks, benefits and alternatives were discussed with patient.  Patient is in agreement and understands the assessment and plan.  All questions were answered.  Sun Screen Education was given.    Return to Clinic in 3 months or sooner as needed.   Eliza Gannon PA-C   [facility]   ____________________________________________    CC: Derm Problem (Possible psoriasis.  Itchy and scaly.  Flare up the in the last few weeks.  Went to  because his fingers split open, given Keflex/Diprolene and it's been helping. )    HPI:  Mr. Afshin Sage is a(n) 37 year old male who presents today as a new patient for psoriasis.     The patient reports that his grandpa and father had psoriasis. His father was covered from head to toe. The patient has had it on his knees and elbows for the last four to five years. Slowly became larger but within the last few weeks the patches spread to his hands, arms and face. He went to urgent care a few weeks ago because he experienced a flare (pain 9/10) and his fingers spilt open. He was given keflex and betamethasone, which are are improving his skin. Still experiencing pain at a 2/10. Flakes are falling off of his skin.    Denies any arthritis or recent joint pain. History of prediabetes and prehypertension many years ago. Has strep throat a few weeks ago. This is now resolved.     Patient is otherwise feeling well, without additional skin concerns.    Labs Reviewed:  ALT, AST, lipids (2/11/21)  CBC, CMP, HIV, tuberculosis, hepatitis screening from 3/7/2023    Physical Exam:  Vitals: There were no vitals taken for this visit.  SKIN: Total skin excluding the undergarment areas was performed. The exam included the head/face, neck, both arms, chest, back, abdomen, both legs, digits and/or nails. BSA of psoriasis approximately 75%.   - Pink scattered 1-2 mm papules throughout the back, upper medial arms, chest abdomen, thighs and lower legs, some with scale.  - Pink plaques scattered across the forehead.  - Pink plaques with overlying micaceous scale on the extensor surfaces of the elbows extending to the mid forearms and covering the anterior knees.    - Several toenails are hypertrophic.   - Most of the dorsal hands and fingers are covered with pink plaques with some fissuring.  - No other lesions of concern on areas examined.                                                     Medications:  Current Outpatient Medications   Medication     augmented betamethasone dipropionate (DIPROLENE-AF) 0.05 % external ointment     cephALEXin (KEFLEX) 500 MG capsule     No current facility-administered medications for this visit.      Past Medical History:   Patient Active Problem List   Diagnosis     Hypertension     ADHD (attention deficit hyperactivity disorder)     Alcohol dependence in remission (H)     Mixed anxiety and depressive disorder     Morbid obesity (H)     History reviewed. No pertinent past medical history.     CC Lake Kruger MD  [address] [place] [address] on close of this encounter.

## 2023-03-07 NOTE — PROGRESS NOTES
There are no exam notes on file for this visit.  Nursing note reviewed with patient.  Accurracy and completeness verified.    Chief Complaint   Patient presents with     Derm Problem       HPI:  Afshin Sage is a 37 year old male who has a a sudden worsening of his psoriasis a couple weeks ago  Previously just some plaques on his albows and knees  It is now everwhere and so thick on his hands at the point he can barely move his fingers  It now includes spots all over his body and worsening of the plaques, the hands were not previously very involved    Interestingly he had strep 3 weeks ago and the sudden worsneing does seem to be time-related    ROS: As per HPI.  Constitutional: no recent illness, no fevers/sweats/chills      Allergies, reviewed:     Allergies   Allergen Reactions     Buspirone Other (See Comments)     Suicidal thoughts  Suicidal thoughts       Escitalopram Other (See Comments)     Suicidal thoughts  Suicidal thoughts       Sertraline Other (See Comments)     Suicidal thoughts  Suicidal thoughts       Iohexol      Other reaction(s): *None-Radiology Only  Sneezing, congestion  Other reaction(s): *None-Radiology Only  Sneezing, congestion        Med list prior to vist:  No current outpatient medications on file prior to visit.  No current facility-administered medications on file prior to visit.    Medications reviewed and updated    Objective:  BP (!) 169/110   Pulse 91   Temp 98.4  F (36.9  C) (Tympanic)   Wt (!) 169.8 kg (374 lb 6.4 oz)   BMI 48.99 kg/m    General Appearance: Pleasant, alert,   Skin: mix of guttate lesions on the body and plaques with severe plaque formation and cracking/bleeding on the dorsal hands and finger  See photograph below  Neurologic Exam: Nonfocal, no tremor. Normal gait.  Psychiatric Exam: Alert - appropriate, normal affect    ASSESSMENT/PLAN:    ICD-10-CM    1. Acute guttate psoriasis  L40.4 cephALEXin (KEFLEX) 500 MG capsule     Adult Dermatology Referral      augmented betamethasone dipropionate (DIPROLENE-AF) 0.05 % external ointment     Adult Dermatology Referral      2. Strep throat  J02.0 cephALEXin (KEFLEX) 500 MG capsule     Adult Dermatology Referral               Topical steroids, abx for skin cracking and possible improvement related to strep, urgent referral to derm for follow up      Lake Kruger MD MPH

## 2023-03-08 ENCOUNTER — LAB (OUTPATIENT)
Dept: LAB | Facility: CLINIC | Age: 38
End: 2023-03-08
Payer: COMMERCIAL

## 2023-03-08 ENCOUNTER — OFFICE VISIT (OUTPATIENT)
Dept: DERMATOLOGY | Facility: CLINIC | Age: 38
End: 2023-03-08
Payer: COMMERCIAL

## 2023-03-08 DIAGNOSIS — R73.9 BLOOD GLUCOSE ELEVATED: ICD-10-CM

## 2023-03-08 DIAGNOSIS — L40.4 ACUTE GUTTATE PSORIASIS: ICD-10-CM

## 2023-03-08 DIAGNOSIS — L40.0 PLAQUE PSORIASIS: ICD-10-CM

## 2023-03-08 DIAGNOSIS — L40.0 PLAQUE PSORIASIS: Primary | ICD-10-CM

## 2023-03-08 DIAGNOSIS — J02.0 STREP THROAT: ICD-10-CM

## 2023-03-08 LAB
ALBUMIN SERPL BCG-MCNC: 4.1 G/DL (ref 3.5–5.2)
ALP SERPL-CCNC: 86 U/L (ref 40–129)
ALT SERPL W P-5'-P-CCNC: 68 U/L (ref 10–50)
ANION GAP SERPL CALCULATED.3IONS-SCNC: 12 MMOL/L (ref 7–15)
AST SERPL W P-5'-P-CCNC: 59 U/L (ref 10–50)
BASOPHILS # BLD AUTO: 0.1 10E3/UL (ref 0–0.2)
BASOPHILS NFR BLD AUTO: 1 %
BILIRUB DIRECT SERPL-MCNC: <0.2 MG/DL (ref 0–0.3)
BILIRUB SERPL-MCNC: 0.5 MG/DL
BUN SERPL-MCNC: 9.5 MG/DL (ref 6–20)
CALCIUM SERPL-MCNC: 9.8 MG/DL (ref 8.6–10)
CHLORIDE SERPL-SCNC: 98 MMOL/L (ref 98–107)
CREAT SERPL-MCNC: 0.43 MG/DL (ref 0.67–1.17)
DEPRECATED HCO3 PLAS-SCNC: 26 MMOL/L (ref 22–29)
EOSINOPHIL # BLD AUTO: 0.4 10E3/UL (ref 0–0.7)
EOSINOPHIL NFR BLD AUTO: 4 %
ERYTHROCYTE [DISTWIDTH] IN BLOOD BY AUTOMATED COUNT: 11.4 % (ref 10–15)
GFR SERPL CREATININE-BSD FRML MDRD: >90 ML/MIN/1.73M2
GLUCOSE SERPL-MCNC: 468 MG/DL (ref 70–99)
HBV CORE AB SERPL QL IA: NONREACTIVE
HBV SURFACE AB SERPL IA-ACNC: 33.97 M[IU]/ML
HBV SURFACE AB SERPL IA-ACNC: REACTIVE M[IU]/ML
HBV SURFACE AG SERPL QL IA: NONREACTIVE
HCT VFR BLD AUTO: 47.7 % (ref 40–53)
HCV AB SERPL QL IA: NONREACTIVE
HGB BLD-MCNC: 15.9 G/DL (ref 13.3–17.7)
IMM GRANULOCYTES # BLD: 0 10E3/UL
IMM GRANULOCYTES NFR BLD: 0 %
LYMPHOCYTES # BLD AUTO: 2.7 10E3/UL (ref 0.8–5.3)
LYMPHOCYTES NFR BLD AUTO: 24 %
MCH RBC QN AUTO: 29.4 PG (ref 26.5–33)
MCHC RBC AUTO-ENTMCNC: 33.3 G/DL (ref 31.5–36.5)
MCV RBC AUTO: 88 FL (ref 78–100)
MONOCYTES # BLD AUTO: 0.7 10E3/UL (ref 0–1.3)
MONOCYTES NFR BLD AUTO: 6 %
NEUTROPHILS # BLD AUTO: 7.2 10E3/UL (ref 1.6–8.3)
NEUTROPHILS NFR BLD AUTO: 65 %
NRBC # BLD AUTO: 0 10E3/UL
NRBC BLD AUTO-RTO: 0 /100
PLATELET # BLD AUTO: 325 10E3/UL (ref 150–450)
POTASSIUM SERPL-SCNC: 4.2 MMOL/L (ref 3.4–5.3)
PROT SERPL-MCNC: 7.5 G/DL (ref 6.4–8.3)
RBC # BLD AUTO: 5.4 10E6/UL (ref 4.4–5.9)
SODIUM SERPL-SCNC: 136 MMOL/L (ref 136–145)
WBC # BLD AUTO: 11.1 10E3/UL (ref 4–11)

## 2023-03-08 PROCEDURE — 83036 HEMOGLOBIN GLYCOSYLATED A1C: CPT | Performed by: STUDENT IN AN ORGANIZED HEALTH CARE EDUCATION/TRAINING PROGRAM

## 2023-03-08 PROCEDURE — 82248 BILIRUBIN DIRECT: CPT | Performed by: PATHOLOGY

## 2023-03-08 PROCEDURE — 86704 HEP B CORE ANTIBODY TOTAL: CPT | Performed by: PHYSICIAN ASSISTANT

## 2023-03-08 PROCEDURE — 36415 COLL VENOUS BLD VENIPUNCTURE: CPT | Performed by: PATHOLOGY

## 2023-03-08 PROCEDURE — 87389 HIV-1 AG W/HIV-1&-2 AB AG IA: CPT | Performed by: PHYSICIAN ASSISTANT

## 2023-03-08 PROCEDURE — 86803 HEPATITIS C AB TEST: CPT | Performed by: PHYSICIAN ASSISTANT

## 2023-03-08 PROCEDURE — 87340 HEPATITIS B SURFACE AG IA: CPT | Performed by: PHYSICIAN ASSISTANT

## 2023-03-08 PROCEDURE — 86706 HEP B SURFACE ANTIBODY: CPT | Performed by: PHYSICIAN ASSISTANT

## 2023-03-08 PROCEDURE — 85025 COMPLETE CBC W/AUTO DIFF WBC: CPT | Performed by: PATHOLOGY

## 2023-03-08 PROCEDURE — 80053 COMPREHEN METABOLIC PANEL: CPT | Performed by: PATHOLOGY

## 2023-03-08 PROCEDURE — 86481 TB AG RESPONSE T-CELL SUSP: CPT | Performed by: PHYSICIAN ASSISTANT

## 2023-03-08 PROCEDURE — 99204 OFFICE O/P NEW MOD 45 MIN: CPT | Performed by: PHYSICIAN ASSISTANT

## 2023-03-08 ASSESSMENT — PAIN SCALES - GENERAL: PAINLEVEL: MILD PAIN (2)

## 2023-03-08 NOTE — NURSING NOTE
Dermatology Rooming Note    Afshin Sage's goals for this visit include:   Chief Complaint   Patient presents with     Derm Problem     Possible psoriasis.  Itchy and scaly.  Flare up the in the last few weeks.  Went to UC because his fingers split open, given Keflex/Diprolene and it's been helping.      Mai Bowling, CMA

## 2023-03-08 NOTE — LETTER
3/8/2023       RE: Afshin Sage  [address]  [place] [address]     Dear Colleague,    Thank you for referring your patient, Afshin Sage, to the [facility] [place] at [facility]. Please see a copy of my visit note below.    [facility] Dermatology Note  Encounter Date: Mar 8, 2023  Office Visit     Dermatology Problem List:  1. Acute guttae psoriasis   - Humira, betamethasone 0.05% ointment   - S/p keflex 500 mg  ____________________________________________    Assessment & Plan:    # Acute guttate psoriasis, severely flaring. (BSA 75%) Discussed treatment options, including topicals, nbUVB, and systemic therapies such as Humira, Taltz or methotrexate. Patient cannot realistically come to clinic 3x weekly for light therapy, elected to try Humira. Would prefer to avoid methotrexate due to history of elevated liver levels.  - Prescription provided for Humira 40 mg every other week.  Instructed to inject 80 mg (2 syringes) day 0, 40 mg day 7, then 40 mg every other week.    - Labs today: CBC, Quant Gold TB, ALT, AST, HIV, Hep B, Hep C, hepatic, CMP.   - Can continue betamethasone 0.05% ointment BID as needed   -Finish antibiotics.   - Advised to avoid fragranted lotions  - Photodocumentation today    Discussed expectations and triggers for psoriasis. May be at risk for CAD, HTN, DM and lymphoma and should be monitored at a primary care office.    # hyperglycemia,  Discussed follow up with pmd within 2 weeks.   -non fasting glucose, needs repeat labs.     Procedures Performed:   None.    Follow-up: 3 month(s) in-person, or earlier for new or changing lesions    Staff and Scribe:     Scribe Disclosure:  DAMON HYDE, am serving as a scribe to document services personally performed by Eliza Gannon PA-C based on data collection and the provider's statements to me.     Provider Disclosure:    The documentation recorded by the scribe accurately reflects the services I personally performed and the decisions made by me.    All risks, benefits and alternatives were discussed with patient.  Patient is in agreement and understands the assessment and plan.  All questions were answered.  Sun Screen Education was given.   Return to Clinic in 3 months or sooner as needed.   Eliza Gannon PA-C   [facility]   ____________________________________________    CC: Derm Problem (Possible psoriasis.  Itchy and scaly.  Flare up the in the last few weeks.  Went to  because his fingers split open, given Keflex/Diprolene and it's been helping. )    HPI:  Mr. Afshin Sage is a(n) 37 year old male who presents today as a new patient for psoriasis.     The patient reports that his grandpa and father had psoriasis. His father was covered from head to toe. The patient has had it on his knees and elbows for the last four to five years. Slowly became larger but within the last few weeks the patches spread to his hands, arms and face. He went to urgent care a few weeks ago because he experienced a flare (pain 9/10) and his fingers spilt open. He was given keflex and betamethasone, which are are improving his skin. Still experiencing pain at a 2/10. Flakes are falling off of his skin.    Denies any arthritis or recent joint pain. History of prediabetes and prehypertension many years ago. Has strep throat a few weeks ago. This is now resolved.     Patient is otherwise feeling well, without additional skin concerns.    Labs Reviewed:  ALT, AST, lipids (2/11/21)  CBC, CMP, HIV, tuberculosis, hepatitis screening from 3/7/2023    Physical Exam:  Vitals: There were no vitals taken for this visit.  SKIN: Total skin excluding the undergarment areas was performed. The exam included the head/face, neck, both arms, chest, back, abdomen, both legs, digits and/or nails. BSA of psoriasis approximately  75%.   - Pink scattered 1-2 mm papules throughout the back, upper medial arms, chest abdomen, thighs and lower legs, some with scale.  - Pink plaques scattered across the forehead.  - Pink plaques with overlying micaceous scale on the extensor surfaces of the elbows extending to the mid forearms and covering the anterior knees.   - Several toenails are hypertrophic.   - Most of the dorsal hands and fingers are covered with pink plaques with some fissuring.  - No other lesions of concern on areas examined.                                                     Medications:  Current Outpatient Medications   Medication     augmented betamethasone dipropionate (DIPROLENE-AF) 0.05 % external ointment     cephALEXin (KEFLEX) 500 MG capsule     No current facility-administered medications for this visit.      Past Medical History:   Patient Active Problem List   Diagnosis     Hypertension     ADHD (attention deficit hyperactivity disorder)     Alcohol dependence in remission (H)     Mixed anxiety and depressive disorder     Morbid obesity (H)     History reviewed. No pertinent past medical history.     CC Lake Kruger MD  [address] [place] [address] on close of this encounter.

## 2023-03-09 LAB
HIV 1+2 AB+HIV1 P24 AG SERPL QL IA: NONREACTIVE
QUANTIFERON MITOGEN: 10 IU/ML
QUANTIFERON NIL TUBE: 0.03 IU/ML
QUANTIFERON TB1 TUBE: 0.05 IU/ML
QUANTIFERON TB2 TUBE: 0.04

## 2023-03-10 ENCOUNTER — VIRTUAL VISIT (OUTPATIENT)
Dept: FAMILY MEDICINE | Facility: CLINIC | Age: 38
End: 2023-03-10
Payer: COMMERCIAL

## 2023-03-10 DIAGNOSIS — R35.89 POLYURIA: ICD-10-CM

## 2023-03-10 DIAGNOSIS — R73.9 BLOOD GLUCOSE ELEVATED: Primary | ICD-10-CM

## 2023-03-10 LAB
GAMMA INTERFERON BACKGROUND BLD IA-ACNC: 0.03 IU/ML
HBA1C MFR BLD: 11 %
M TB IFN-G BLD-IMP: NEGATIVE
M TB IFN-G CD4+ BCKGRND COR BLD-ACNC: 9.97 IU/ML
MITOGEN IGNF BCKGRD COR BLD-ACNC: 0.01 IU/ML
MITOGEN IGNF BCKGRD COR BLD-ACNC: 0.02 IU/ML

## 2023-03-10 PROCEDURE — 99213 OFFICE O/P EST LOW 20 MIN: CPT | Mod: VID | Performed by: STUDENT IN AN ORGANIZED HEALTH CARE EDUCATION/TRAINING PROGRAM

## 2023-03-10 NOTE — PATIENT INSTRUCTIONS
Chencho Loya,    Thank you for allowing [facility] to manage your care.    I ordered some blood work, please go to the laboratory to get your laboratory studies.    For your convenience, test results are released as soon as they are available  Please allow 1-2 business days for me to send you a comment about your results.  If not done so, I encourage you to login into Pixia ([email or web address] to review your results in real time.     If you have any questions or concerns, please feel free to call us at [phone].    Sincerely,    Dr. Fisher    Did you know?      You can schedule a video visit for follow-up appointments as well as future appointments for certain conditions.  Please see the below link.     [email or web address]    If you have not already done so,  I encourage you to sign up for Grapewordt ([email or web address]).  This will allow you to review your results, securely communicate with a provider, and schedule virtual visits as well.

## 2023-03-10 NOTE — PROGRESS NOTES
Vincenzo is a 37 year old who is being evaluated via a billable video visit.      How would you like to obtain your AVS? MyChart  If the video visit is dropped, the invitation should be resent by: Text to cell phone: [phone]  Will anyone else be joining your video visit? No        Assessment & Plan     1. Blood glucose elevated    - Hemoglobin A1c; Future    2. Polyuria      Return in about 1 week (around 3/17/2023) for Follow up, in person.    Chandni Fisher MD  [facility] MELANIE Loya is a 37 year old presenting for the following health issues:  No chief complaint on file.      HPI     Dad's side has Diabetes. Has been having frequent urination, dizziness, unexplained weight loss. Saw the dermatologist couple days ago. Labs from 3/8/23 glucose was 468. Wants to get process started on coming in for fasting blood work and what to do next  He has not been on oral steroid recently      Review of Systems   Constitutional, HEENT, cardiovascular, pulmonary, gi and gu systems are negative, except as otherwise noted.      Objective           Vitals:  No vitals were obtained today due to virtual visit.    Physical Exam   GENERAL: Healthy, alert and no distress  EYES: Eyes grossly normal to inspection.  No discharge or erythema, or obvious scleral/conjunctival abnormalities.  RESP: No audible wheeze, cough, or visible cyanosis.  No visible retractions or increased work of breathing.    PSYCH: Mentation appears normal, affect normal/bright, judgement and insight intact, normal speech and appearance well-groomed.            Video-Visit Details    Type of service:  Video Visit     Originating Location (pt. Location): Home  Distant Location (provider location):  On-site  Platform used for Video Visit: Cartilix

## 2023-03-13 ENCOUNTER — TELEPHONE (OUTPATIENT)
Dept: DERMATOLOGY | Facility: CLINIC | Age: 38
End: 2023-03-13

## 2023-03-13 NOTE — TELEPHONE ENCOUNTER
PA Initiation    Medication: Humira  Insurance Company: [employer] - Phone [phone] Fax [phone]  Pharmacy Filling the Rx: [facility] [address]  Filling Pharmacy Phone:    Filling Pharmacy Fax:    Start Date: 3/13/2023    Key: [id number]

## 2023-03-15 ENCOUNTER — OFFICE VISIT (OUTPATIENT)
Dept: FAMILY MEDICINE | Facility: CLINIC | Age: 38
End: 2023-03-15
Payer: COMMERCIAL

## 2023-03-15 VITALS
DIASTOLIC BLOOD PRESSURE: 90 MMHG | HEART RATE: 100 BPM | HEIGHT: 73 IN | RESPIRATION RATE: 18 BRPM | BODY MASS INDEX: 41.75 KG/M2 | TEMPERATURE: 98.1 F | OXYGEN SATURATION: 95 % | WEIGHT: 315 LBS | SYSTOLIC BLOOD PRESSURE: 144 MMHG

## 2023-03-15 DIAGNOSIS — E66.01 MORBID OBESITY (H): ICD-10-CM

## 2023-03-15 DIAGNOSIS — E11.9 TYPE 2 DIABETES MELLITUS WITHOUT COMPLICATION, WITHOUT LONG-TERM CURRENT USE OF INSULIN (H): Primary | ICD-10-CM

## 2023-03-15 DIAGNOSIS — F10.21 ALCOHOL DEPENDENCE IN REMISSION (H): ICD-10-CM

## 2023-03-15 DIAGNOSIS — I10 BENIGN ESSENTIAL HYPERTENSION: ICD-10-CM

## 2023-03-15 DIAGNOSIS — E78.5 HYPERLIPIDEMIA LDL GOAL <70: ICD-10-CM

## 2023-03-15 LAB
CHOLEST SERPL-MCNC: 189 MG/DL
FASTING STATUS PATIENT QL REPORTED: NO
HDLC SERPL-MCNC: 37 MG/DL
LDLC SERPL CALC-MCNC: 92 MG/DL
NONHDLC SERPL-MCNC: 152 MG/DL
TRIGL SERPL-MCNC: 302 MG/DL

## 2023-03-15 PROCEDURE — 99214 OFFICE O/P EST MOD 30 MIN: CPT | Performed by: PHYSICIAN ASSISTANT

## 2023-03-15 PROCEDURE — 80061 LIPID PANEL: CPT | Performed by: PHYSICIAN ASSISTANT

## 2023-03-15 PROCEDURE — 36415 COLL VENOUS BLD VENIPUNCTURE: CPT | Performed by: PHYSICIAN ASSISTANT

## 2023-03-15 RX ORDER — METFORMIN HCL 500 MG
1000 TABLET, EXTENDED RELEASE 24 HR ORAL
Qty: 180 TABLET | Refills: 0 | Status: SHIPPED | OUTPATIENT
Start: 2023-03-15 | End: 2023-06-21

## 2023-03-15 RX ORDER — LISINOPRIL 5 MG/1
5 TABLET ORAL DAILY
Qty: 90 TABLET | Refills: 0 | Status: SHIPPED | OUTPATIENT
Start: 2023-03-15 | End: 2023-06-21

## 2023-03-15 RX ORDER — ATORVASTATIN CALCIUM 10 MG/1
10 TABLET, FILM COATED ORAL DAILY
Qty: 90 TABLET | Refills: 0 | Status: SHIPPED | OUTPATIENT
Start: 2023-03-15 | End: 2023-06-20

## 2023-03-15 ASSESSMENT — PAIN SCALES - GENERAL: PAINLEVEL: NO PAIN (0)

## 2023-03-15 NOTE — PROGRESS NOTES
"  Assessment & Plan     Type 2 diabetes mellitus without complication, without long-term current use of insulin (H)  New diagnosis  - Lipid panel reflex to direct LDL Non-fasting; Future  - metFORMIN (GLUCOPHAGE XR) 500 MG 24 hr tablet; Take 2 tablets (1,000 mg) by mouth daily (with dinner)  - Lipid panel reflex to direct LDL Non-fasting  Was diagnosed outside of this clinic, but was not put on any medications for it.   Hyperlipidemia LDL goal <70  Medication added  - atorvastatin (LIPITOR) 10 MG tablet; Take 1 tablet (10 mg) by mouth daily    Benign essential hypertension  Medication added  - lisinopril (ZESTRIL) 5 MG tablet; Take 1 tablet (5 mg) by mouth daily    Alcohol dependence in remission (H)  Encouraged lack of alcohol. Talked about sugars in alcohol disrupting diabetes    Morbid obesity (H)  Discussed lifestyle changes.  Cut out sugary drinks. Limit fats, carbs, fried foods.   Encouraged cardio exercises           BMI:   Estimated body mass index is 49.08 kg/m  as calculated from the following:    Height as of this encounter: 1.854 m (6' 1\").    Weight as of this encounter: 168.7 kg (372 lb).   Weight management plan: Discussed healthy diet and exercise guidelines        Return for Follow up 3 months with me for diabetes recheck, Prevent visit .    FREDO CHEUNG [facility] KELLY Loya is a 37 year old, presenting for the following health issues:  Follow Up (Diabetes)        History of Present Illness       Diabetes:   He presents for follow up of diabetes.  He is checking home blood glucose four or more times daily. He checks blood glucose before and after meals and at bedtime.  Blood glucose is sometimes over 200 and never under 70. When his blood glucose is low, the patient is asymptomatic for confusion, blurred vision, lethargy and reports not feeling dizzy, shaky, or weak.  He is concerned about blood sugar frequently over 200.  He is having excessive thirst, " "blurry vision and weight loss.         He eats 0-1 servings of fruits and vegetables daily.He consumes 5 sweetened beverage(s) daily.He exercises with enough effort to increase his heart rate 9 or less minutes per day.  He exercises with enough effort to increase his heart rate 3 or less days per week.   He is taking medications regularly.             Review of Systems   Constitutional, HEENT, cardiovascular, pulmonary, gi and gu systems are negative, except as otherwise noted.        Objective    BP (!) 144/90   Pulse 100   Temp 98.1  F (36.7  C) (Tympanic)   Resp 18   Ht 1.854 m (6' 1\")   Wt (!) 168.7 kg (372 lb)   SpO2 95%   BMI 49.08 kg/m    Body mass index is 49.08 kg/m .       Physical Exam   GENERAL: healthy, alert and no distress  NECK: no adenopathy, no asymmetry, masses, or scars and thyroid normal to palpation  RESP: lungs clear to auscultation - no rales, rhonchi or wheezes  CV: regular rate and rhythm, normal S1 S2, no S3 or S4, no murmur, click or rub, no peripheral edema and peripheral pulses strong  ABDOMEN: soft, nontender, no hepatosplenomegaly, no masses and bowel sounds normal                    "

## 2023-03-15 NOTE — TELEPHONE ENCOUNTER
Prior Authorization Approval    Authorization Effective Date: 3/3/2023  Authorization Expiration Date: 3/12/2024  Medication: Humira  Approved Dose/Quantity: 3 for 28 days  Reference #: Key: [id number]   Insurance Company: [employer] - Phone [phone] Fax [phone]  Expected CoPay: $3368.61     CoPay Card Available: Yes    Foundation Assistance Needed:    Which [facility] filling the prescription [facility]Not needed for infusion[facility]clinic [facility] [address]  Pharmacy Notified: Yes  Patient Notified: Yes

## 2023-03-28 ENCOUNTER — TELEPHONE (OUTPATIENT)
Dept: DERMATOLOGY | Facility: CLINIC | Age: 38
End: 2023-03-28
Payer: COMMERCIAL

## 2023-03-28 NOTE — TELEPHONE ENCOUNTER
[facility] Center    Phone Message    May a detailed message be left on voicemail: yes     Reason for Call: Medication Question or concern regarding medication   Prescription Clarification  Name of Medication: adalimumab (HUMIRA *CF*) 80 MG/0.8ML pen kit  Prescribing Provider: Teressa   Pharmacy:[facility] [address]  ph: [phone]     What on the order needs clarification?quantity isn't enough for full loading dose, there is a starter kit available.         Action Taken: Message routed to:  [facility] DERM    Travel Screening: Not Applicable

## 2023-03-31 ENCOUNTER — TELEPHONE (OUTPATIENT)
Dept: DERMATOLOGY | Facility: CLINIC | Age: 38
End: 2023-03-31
Payer: COMMERCIAL

## 2023-03-31 DIAGNOSIS — L40.4 ACUTE GUTTATE PSORIASIS: ICD-10-CM

## 2023-03-31 DIAGNOSIS — L40.0 PLAQUE PSORIASIS: ICD-10-CM

## 2023-03-31 NOTE — TELEPHONE ENCOUNTER
[facility] Call Center    Phone Message    May a detailed message be left on voicemail: no     Reason for Call: Medication Question or concern regarding medication   Prescription Clarification  Name of Medication: adalimumab (HUMIRA *CF*) 80 MG/0.8ML pen kit  Prescribing Provider: Eliza Gannon PA-C   Pharmacy: [address]   What on the order needs clarification? Pharmacist states the need to clarify the quantity. The Rx was written for one pen but it comes as a kit of three. Pharmacist states it needs to be rewritten for a starter kit.     Please call with any questions: [phone] ref#[id number] and ask for a pharmacist.     Thank you.     Action Taken: Message routed to:  [facility] Derm    Travel Screening: Not Applicable

## 2023-04-04 ENCOUNTER — TELEPHONE (OUTPATIENT)
Dept: FAMILY MEDICINE | Facility: CLINIC | Age: 38
End: 2023-04-04
Payer: COMMERCIAL

## 2023-04-05 NOTE — TELEPHONE ENCOUNTER
Called patient to clarify, patient states his time off balance is low due to symptoms from diabetes, and medications. Would like FMLA for future doctor visits related to condition and in case work is missed.        John Parra

## 2023-04-22 ENCOUNTER — HEALTH MAINTENANCE LETTER (OUTPATIENT)
Age: 38
End: 2023-04-22

## 2023-04-26 ENCOUNTER — TELEPHONE (OUTPATIENT)
Dept: FAMILY MEDICINE | Facility: CLINIC | Age: 38
End: 2023-04-26
Payer: COMMERCIAL

## 2023-04-26 NOTE — LETTER
May 12, 2023    To  Afshin Sage  [address]   [place] [address]    Your team at [facility] cares about your health. We have reviewed your chart and based on our findings; we are making the following recommendations to better manage your health.     You are in particular need of attention regarding the following:     Schedule a DIABETIC FOLLOW UP appointment for A1C. Patients with diabetes should see their provider regularly.  HYPERTENSION FOLLOW UP: Blood pressure check with nurse  PREVENTATIVE VISIT: Physical  OTHER FOLLOW UP: pneumonia, Tdap vaccines    If you have already completed these items, please contact the clinic via phone or   Integratehart so your care team can review and update your records. Thank you for   choosing [facility] for your healthcare needs. For any questions,   concerns, or to schedule an appointment please contact our clinic.    Healthy Regards,      Your [facility] Care Team

## 2023-04-26 NOTE — TELEPHONE ENCOUNTER
Patient Quality Outreach    Patient is due for the following:   Diabetes -  A1C  Hypertension -  BP check  Physical Preventive Adult Physical      Topic Date Due     Hepatitis B Vaccine (1 of 3 - 3-dose series) Never done     COVID-19 Vaccine (1) Never done     Pneumococcal Vaccine (2 - PCV) 01/01/2010     Flu Vaccine (1) 09/01/2022     Diptheria Tetanus Pertussis (DTAP/TDAP/TD) Vaccine (7 - Td or Tdap) 11/20/2022     Type of outreach:    Phone, left message for patient/parent to call back. and Sent TeamRock message.      Questions for provider review:    None     Chelly Moore [facility]  Chart routed to Care Team.

## 2023-05-12 NOTE — TELEPHONE ENCOUNTER
Patient Quality Outreach        Type of outreach:    Sent letter.    Next Steps:  Reach out within 90 days via Phone, MyChart and Letter.    Max number of attempts reached: No. Will try again in 90 days if patient still on fail list.          Chelly Moore, CMA

## 2023-06-20 DIAGNOSIS — I10 BENIGN ESSENTIAL HYPERTENSION: ICD-10-CM

## 2023-06-20 DIAGNOSIS — E11.9 TYPE 2 DIABETES MELLITUS WITHOUT COMPLICATION, WITHOUT LONG-TERM CURRENT USE OF INSULIN (H): ICD-10-CM

## 2023-06-20 DIAGNOSIS — E78.5 HYPERLIPIDEMIA LDL GOAL <70: ICD-10-CM

## 2023-06-20 RX ORDER — ATORVASTATIN CALCIUM 10 MG/1
10 TABLET, FILM COATED ORAL DAILY
Qty: 90 TABLET | Refills: 0 | Status: SHIPPED | OUTPATIENT
Start: 2023-06-20

## 2023-06-21 RX ORDER — METFORMIN HCL 500 MG
1000 TABLET, EXTENDED RELEASE 24 HR ORAL
Qty: 180 TABLET | Refills: 0 | Status: SHIPPED | OUTPATIENT
Start: 2023-06-21

## 2023-06-21 RX ORDER — LISINOPRIL 5 MG/1
5 TABLET ORAL DAILY
Qty: 90 TABLET | Refills: 0 | Status: SHIPPED | OUTPATIENT
Start: 2023-06-21

## 2023-07-15 ENCOUNTER — HEALTH MAINTENANCE LETTER (OUTPATIENT)
Age: 38
End: 2023-07-15

## 2023-12-02 ENCOUNTER — HEALTH MAINTENANCE LETTER (OUTPATIENT)
Age: 38
End: 2023-12-02

## 2024-04-14 ENCOUNTER — HEALTH MAINTENANCE LETTER (OUTPATIENT)
Age: 39
End: 2024-04-14

## 2024-06-23 ENCOUNTER — HEALTH MAINTENANCE LETTER (OUTPATIENT)
Age: 39
End: 2024-06-23

## 2024-09-01 ENCOUNTER — HEALTH MAINTENANCE LETTER (OUTPATIENT)
Age: 39
End: 2024-09-01

## 2025-01-04 ENCOUNTER — HEALTH MAINTENANCE LETTER (OUTPATIENT)
Age: 40
End: 2025-01-04

## 2025-04-19 ENCOUNTER — HEALTH MAINTENANCE LETTER (OUTPATIENT)
Age: 40
End: 2025-04-19

## 2025-07-12 ENCOUNTER — HEALTH MAINTENANCE LETTER (OUTPATIENT)
Age: 40
End: 2025-07-12

## 2025-08-02 ENCOUNTER — HEALTH MAINTENANCE LETTER (OUTPATIENT)
Age: 40
End: 2025-08-02